# Patient Record
Sex: MALE | Race: BLACK OR AFRICAN AMERICAN | NOT HISPANIC OR LATINO | Employment: FULL TIME | URBAN - METROPOLITAN AREA
[De-identification: names, ages, dates, MRNs, and addresses within clinical notes are randomized per-mention and may not be internally consistent; named-entity substitution may affect disease eponyms.]

---

## 2023-01-18 ENCOUNTER — HOSPITAL ENCOUNTER (EMERGENCY)
Facility: HOSPITAL | Age: 33
Discharge: HOME | End: 2023-01-18
Attending: STUDENT IN AN ORGANIZED HEALTH CARE EDUCATION/TRAINING PROGRAM
Payer: MEDICAID

## 2023-01-18 VITALS
DIASTOLIC BLOOD PRESSURE: 84 MMHG | HEIGHT: 73 IN | BODY MASS INDEX: 26.51 KG/M2 | TEMPERATURE: 99 F | RESPIRATION RATE: 20 BRPM | OXYGEN SATURATION: 95 % | HEART RATE: 112 BPM | WEIGHT: 200 LBS | SYSTOLIC BLOOD PRESSURE: 143 MMHG

## 2023-01-18 DIAGNOSIS — U07.1 COVID-19: Primary | ICD-10-CM

## 2023-01-18 LAB
FLUAV AG UPPER RESP QL IA.RAPID: NOT DETECTED
FLUBV AG UPPER RESP QL IA.RAPID: NOT DETECTED
SARS-COV-2 RNA RESP QL NAA+PROBE: DETECTED

## 2023-01-18 PROCEDURE — 99283 EMERGENCY DEPT VISIT LOW MDM: CPT

## 2023-01-18 PROCEDURE — 0240U COVID/FLU A&B PCR: CPT | Performed by: STUDENT IN AN ORGANIZED HEALTH CARE EDUCATION/TRAINING PROGRAM

## 2023-01-18 PROCEDURE — 25000003 PHARM REV CODE 250: Performed by: STUDENT IN AN ORGANIZED HEALTH CARE EDUCATION/TRAINING PROGRAM

## 2023-01-18 RX ORDER — IBUPROFEN 600 MG/1
600 TABLET ORAL
Status: COMPLETED | OUTPATIENT
Start: 2023-01-18 | End: 2023-01-18

## 2023-01-18 RX ORDER — IBUPROFEN 600 MG/1
600 TABLET ORAL
Status: DISCONTINUED | OUTPATIENT
Start: 2023-01-18 | End: 2023-01-18

## 2023-01-18 RX ADMIN — IBUPROFEN 600 MG: 600 TABLET ORAL at 06:01

## 2023-01-18 NOTE — ED PROVIDER NOTES
Encounter Date: 1/18/2023       History     Chief Complaint   Patient presents with    Generalized Body Aches    Weakness    Headache     Patient c/o weakness, body aches, headache, and coughing that started a day ago. Patient reports he took tylenol 1 hour ago     Patient is a 32-year-old  male with a past medical history of type 1 diabetes who presented to the ER today due to cough, nonproductive, sinus congestion, myalgias.  Patient states this started 1 day prior to arrival.  Patient denies any sick contacts.  Patient has taken a Tylenol just prior to arrival.  Patient denies any chest pain, shortness of breath, abdominal pain, ataxia, altered mental status.    Review of patient's allergies indicates:  No Known Allergies  No past medical history on file.  No past surgical history on file.  No family history on file.     Review of Systems   Constitutional:  Positive for chills and fever. Negative for fatigue.   HENT:  Positive for congestion. Negative for sore throat and trouble swallowing.    Eyes:  Negative for pain and visual disturbance.   Respiratory:  Positive for cough. Negative for shortness of breath and wheezing.    Cardiovascular:  Negative for chest pain and palpitations.   Gastrointestinal:  Negative for abdominal pain, blood in stool, constipation, diarrhea, nausea and vomiting.   Genitourinary:  Negative for dysuria and hematuria.   Musculoskeletal:  Positive for myalgias. Negative for back pain.   Skin:  Negative for rash and wound.   Neurological:  Negative for seizures, syncope and headaches.   Psychiatric/Behavioral:  Negative for confusion. The patient is not nervous/anxious.      Physical Exam     Initial Vitals [01/18/23 0559]   BP Pulse Resp Temp SpO2   (!) 143/84 (!) 112 20 (!) 101.8 °F (38.8 °C) 95 %      MAP       --         Physical Exam    Nursing note and vitals reviewed.  Constitutional: He appears well-developed and well-nourished. No distress.   HENT:   Head:  Normocephalic and atraumatic.   Eyes: Conjunctivae and EOM are normal. Right eye exhibits no discharge. Left eye exhibits no discharge. No scleral icterus.   Neck: No tracheal deviation present.   Normal range of motion.  Cardiovascular:  Normal rate, regular rhythm and normal heart sounds.     Exam reveals no gallop and no friction rub.       No murmur heard.  Pulmonary/Chest: Breath sounds normal. No respiratory distress. He has no wheezes. He has no rhonchi. He has no rales.   Musculoskeletal:         General: Normal range of motion.      Cervical back: Normal range of motion.     Neurological: He is alert. He has normal strength.   Skin: Skin is warm and dry. No rash and no abscess noted. No erythema. No pallor.   Psychiatric: His behavior is normal. Judgment normal.       ED Course   Procedures  Labs Reviewed   COVID/FLU A&B PCR - Abnormal; Notable for the following components:       Result Value    SARS-CoV-2 PCR Detected (*)     All other components within normal limits    Narrative:     The Xpert Xpress SARS-CoV-2/FLU/RSV plus is a rapid, multiplexed real-time PCR test intended for the simultaneous qualitative detection and differentiation of SARS-CoV-2, Influenza A, Influenza B, and respiratory syncytial virus (RSV) viral RNA in either nasopharyngeal swab or nasal swab specimens.                Imaging Results    None          Medications   ibuprofen tablet 600 mg (600 mg Oral Given 1/18/23 0626)     Medical Decision Making:   Initial Assessment:   Overall well-appearing 32-year-old male appears to be in no acute distress  Differential Diagnosis:   COVID, flu, viral URI, viral illness  ED Management:  Temp 101.8° on arrival   Just took Tylenol prior to arrival   Motrin 600 given   Patient overall appears well   Symptoms most consistent with a viral pattern   COVID test positive   Flu test negative   I do attribute his symptoms being likely secondary to COVID and did discuss this with him   Five day work  excuse provided to patient and 5 days with face mask wearing afterwards   Maintain adequate hydration was advised  CDC guidelines were provided  Return precautions discussed and follow up with PCP is recommended                        Clinical Impression:   Final diagnoses:  [U07.1] COVID-19 (Primary)        ED Disposition Condition    Discharge Stable          ED Prescriptions    None       Follow-up Information       Follow up With Specialties Details Why Contact Info    [facility] - Emergency Dept Emergency Medicine  If symptoms worsen [address] [facility] [id number] [phone]             Christian Guido MD  01/18/23 0717

## 2023-01-18 NOTE — Clinical Note
"Rusty"Gunnar Burger was seen and treated in our emergency department on 1/18/2023.     COVID-19 is present in our communities across the state. There is limited testing for COVID at this time, so not all patients can be tested. In this situation, your employee meets the following criteria:    Rusty Burger has met the criteria for COVID-19 testing and has a POSITIVE result. He can return to work once they are asymptomatic for 24 hours without the use of fever reducing medications AND at least five days from the first positive result. A mask is recommended for 5 days post quarantine.     If you have any questions or concerns, or if I can be of further assistance, please do not hesitate to contact me.    Sincerely,             LEYLA Honeycutt RN"

## 2023-01-18 NOTE — Clinical Note
"Rusty"Gunnar Burger was seen and treated in our emergency department on 1/18/2023.  He may return to work on 01/24/2023.       If you have any questions or concerns, please don't hesitate to call.      Christian Guido MD"

## 2023-03-02 ENCOUNTER — TELEPHONE (OUTPATIENT)
Dept: OPHTHALMOLOGY | Facility: CLINIC | Age: 33
End: 2023-03-02
Payer: MEDICAID

## 2023-03-02 NOTE — TELEPHONE ENCOUNTER
----- Message from Enedina Diane sent at 3/2/2023 11:43 AM CST -----  Regarding: Referral Inquiry  Pt called to f/u on referral sent by [facility], pt does not know which doctor it was sent for. Pt states its been sent 3 times already.     Requesting Call [phone] (home)

## 2023-03-10 ENCOUNTER — OFFICE VISIT (OUTPATIENT)
Dept: OPHTHALMOLOGY | Facility: CLINIC | Age: 33
End: 2023-03-10
Payer: COMMERCIAL

## 2023-03-10 ENCOUNTER — OFFICE VISIT (OUTPATIENT)
Dept: OPHTHALMOLOGY | Facility: CLINIC | Age: 33
End: 2023-03-10
Payer: MEDICAID

## 2023-03-10 DIAGNOSIS — E10.3542: Primary | ICD-10-CM

## 2023-03-10 DIAGNOSIS — E10.3591 PROLIFERATIVE DIABETIC RETINOPATHY OF RIGHT EYE WITHOUT MACULAR EDEMA ASSOCIATED WITH TYPE 1 DIABETES MELLITUS: ICD-10-CM

## 2023-03-10 PROCEDURE — 92250 FUNDUS PHOTOGRAPHY W/I&R: CPT | Mod: PBBFAC | Performed by: OPHTHALMOLOGY

## 2023-03-10 PROCEDURE — 99999 PR PBB SHADOW E&M-EST. PATIENT-LVL I: ICD-10-PCS | Mod: PBBFAC,,, | Performed by: OPHTHALMOLOGY

## 2023-03-10 PROCEDURE — 1159F PR MEDICATION LIST DOCUMENTED IN MEDICAL RECORD: ICD-10-PCS | Mod: CPTII,,, | Performed by: OPTOMETRIST

## 2023-03-10 PROCEDURE — 99999 PR PBB SHADOW E&M-EST. PATIENT-LVL II: CPT | Mod: PBBFAC,,, | Performed by: OPTOMETRIST

## 2023-03-10 PROCEDURE — 99212 OFFICE O/P EST SF 10 MIN: CPT | Mod: PBBFAC,PO | Performed by: OPTOMETRIST

## 2023-03-10 PROCEDURE — 99499 UNLISTED E&M SERVICE: CPT | Mod: S$PBB,,, | Performed by: OPTOMETRIST

## 2023-03-10 PROCEDURE — 1159F MED LIST DOCD IN RCRD: CPT | Mod: CPTII,,, | Performed by: OPTOMETRIST

## 2023-03-10 PROCEDURE — 92134 POSTERIOR SEGMENT OCT RETINA (OCULAR COHERENCE TOMOGRAPHY)-BOTH EYES: ICD-10-PCS | Mod: S$GLB,,, | Performed by: OPHTHALMOLOGY

## 2023-03-10 PROCEDURE — 99204 OFFICE O/P NEW MOD 45 MIN: CPT | Mod: S$GLB,,, | Performed by: OPHTHALMOLOGY

## 2023-03-10 PROCEDURE — 99999 PR PBB SHADOW E&M-EST. PATIENT-LVL II: ICD-10-PCS | Mod: PBBFAC,,, | Performed by: OPTOMETRIST

## 2023-03-10 PROCEDURE — 92134 CPTRZ OPH DX IMG PST SGM RTA: CPT | Mod: PBBFAC | Performed by: OPHTHALMOLOGY

## 2023-03-10 PROCEDURE — 99999 PR PBB SHADOW E&M-EST. PATIENT-LVL I: CPT | Mod: PBBFAC,,, | Performed by: OPHTHALMOLOGY

## 2023-03-10 PROCEDURE — 99204 PR OFFICE/OUTPT VISIT, NEW, LEVL IV, 45-59 MIN: ICD-10-PCS | Mod: S$GLB,,, | Performed by: OPHTHALMOLOGY

## 2023-03-10 PROCEDURE — 99499 NO LOS: ICD-10-PCS | Mod: S$PBB,,, | Performed by: OPTOMETRIST

## 2023-03-10 PROCEDURE — 99211 OFF/OP EST MAY X REQ PHY/QHP: CPT | Mod: PBBFAC,27 | Performed by: OPHTHALMOLOGY

## 2023-03-10 RX ORDER — PEN NEEDLE, DIABETIC 31 GX5/16"
NEEDLE, DISPOSABLE MISCELLANEOUS
COMMUNITY
Start: 2022-12-22

## 2023-03-10 RX ORDER — MELOXICAM 7.5 MG/1
7.5 TABLET ORAL
COMMUNITY
Start: 2021-10-04

## 2023-03-10 RX ORDER — DOXYCYCLINE 100 MG/1
100 CAPSULE ORAL 2 TIMES DAILY
COMMUNITY
Start: 2022-10-03

## 2023-03-10 RX ORDER — GABAPENTIN 300 MG/1
300 CAPSULE ORAL 2 TIMES DAILY
COMMUNITY
Start: 2022-12-22

## 2023-03-10 RX ORDER — INSULIN GLARGINE 100 [IU]/ML
INJECTION, SOLUTION SUBCUTANEOUS
COMMUNITY
Start: 2023-02-16

## 2023-03-10 RX ORDER — LOSARTAN POTASSIUM 50 MG/1
50 TABLET ORAL
COMMUNITY
Start: 2022-12-22

## 2023-03-10 RX ORDER — INSULIN LISPRO 100 [IU]/ML
INJECTION, SOLUTION INTRAVENOUS; SUBCUTANEOUS
COMMUNITY
Start: 2023-02-16

## 2023-03-10 RX ORDER — ATORVASTATIN CALCIUM 40 MG/1
40 TABLET, FILM COATED ORAL
COMMUNITY
Start: 2022-12-22

## 2023-03-10 RX ORDER — PANTOPRAZOLE SODIUM 40 MG/1
40 TABLET, DELAYED RELEASE ORAL
COMMUNITY
Start: 2021-08-29

## 2023-03-10 NOTE — PROGRESS NOTES
===============================  Date today is 3/10/2023  Rusty Burger is a 32 y.o. male  Last visit [facility]: :Visit date not found   Last visit eye dept. Visit date not found    Uncorrected distance visual acuity was 20/20 in the right eye and CF in the left eye.  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  Check OU independently daily.    Gave my office and personal cell phone number.  ________________  3/10/2023 today  Rusty Burger    DM with PDR OU  Diabetic traction retinal detachment        Clear lens  No NVI  Optos shows NVD OU  OD 2+ DBH, ischemia  OS annular FVP, 4+ NVE, boxcarring  OS poor vision x2-3 months  Discussed tx options with patient to attempt to restore some vision OS  Likely sx will noticeably improve vision OS  OD will need PRP to prevent declining vision  Will have Dr. Burkett evaluate patient for OS surgery    RTC with Dr. Burkett  Instructed to call 24/7 for any worsening of vision or symptoms. Check OU daily.   Gave my office and cell phone number.      =============================

## 2023-03-10 NOTE — PROGRESS NOTES
HPI     Eye Problem            Comments: Blurry VA OS           Comments    Patient states about two months ago he started to have blurry VA OS.   Patient states last A1C was 10.6. Patient denies pain and discomfort.   Patient states he was referred from an outside clinic. Patient has no   further ocular complaints at this time.              Last edited by Davina Silva MA on 3/10/2023  8:05 AM.            Assessment /Plan     For exam results, see Encounter Report.    Left eye affected by proliferative diabetic retinopathy with combined traction and rhegmatogenous retinal detachment, associated with type 1 diabetes mellitus    Proliferative diabetic retinopathy of right eye without macular edema associated with type 1 diabetes mellitus    Patient referred from outside OD from [place] for retinal specialist, mistakenly was scheduled today in OD clinic. Patient was scheduled with Dr PEDRO today for evaluation at 10:30. RTC with Dr PEDRO for retinal consult.

## 2023-03-23 ENCOUNTER — TELEPHONE (OUTPATIENT)
Dept: OPHTHALMOLOGY | Facility: CLINIC | Age: 33
End: 2023-03-23

## 2023-03-23 ENCOUNTER — OFFICE VISIT (OUTPATIENT)
Dept: OPHTHALMOLOGY | Facility: CLINIC | Age: 33
End: 2023-03-23
Payer: MEDICAID

## 2023-03-23 DIAGNOSIS — E10.3591 TYPE 1 DIABETES MELLITUS WITH PROLIFERATIVE RETINOPATHY OF RIGHT EYE WITHOUT MACULAR EDEMA: ICD-10-CM

## 2023-03-23 DIAGNOSIS — E10.3522: Primary | ICD-10-CM

## 2023-03-23 DIAGNOSIS — H33.42 TRACTION DETACHMENT OF LEFT RETINA: Primary | ICD-10-CM

## 2023-03-23 DIAGNOSIS — H33.42 TRACTION RETINAL DETACHMENT, LEFT: ICD-10-CM

## 2023-03-23 DIAGNOSIS — H35.342 FULL THICKNESS MACULAR HOLE, LEFT: ICD-10-CM

## 2023-03-23 PROCEDURE — 4010F PR ACE/ARB THEARPY RXD/TAKEN: ICD-10-PCS | Mod: CPTII,,, | Performed by: OPHTHALMOLOGY

## 2023-03-23 PROCEDURE — 1160F PR REVIEW ALL MEDS BY PRESCRIBER/CLIN PHARMACIST DOCUMENTED: ICD-10-PCS | Mod: CPTII,,, | Performed by: OPHTHALMOLOGY

## 2023-03-23 PROCEDURE — 92014 COMPRE OPH EXAM EST PT 1/>: CPT | Mod: 25,S$PBB,, | Performed by: OPHTHALMOLOGY

## 2023-03-23 PROCEDURE — 92134 CPTRZ OPH DX IMG PST SGM RTA: CPT | Mod: PBBFAC | Performed by: OPHTHALMOLOGY

## 2023-03-23 PROCEDURE — 1159F MED LIST DOCD IN RCRD: CPT | Mod: CPTII,,, | Performed by: OPHTHALMOLOGY

## 2023-03-23 PROCEDURE — 92014 PR EYE EXAM, EST PATIENT,COMPREHESV: ICD-10-PCS | Mod: 25,S$PBB,, | Performed by: OPHTHALMOLOGY

## 2023-03-23 PROCEDURE — 1160F RVW MEDS BY RX/DR IN RCRD: CPT | Mod: CPTII,,, | Performed by: OPHTHALMOLOGY

## 2023-03-23 PROCEDURE — 99213 OFFICE O/P EST LOW 20 MIN: CPT | Mod: PBBFAC | Performed by: OPHTHALMOLOGY

## 2023-03-23 PROCEDURE — 99999 PR PBB SHADOW E&M-EST. PATIENT-LVL III: ICD-10-PCS | Mod: PBBFAC,,, | Performed by: OPHTHALMOLOGY

## 2023-03-23 PROCEDURE — 99999 PR PBB SHADOW E&M-EST. PATIENT-LVL III: CPT | Mod: PBBFAC,,, | Performed by: OPHTHALMOLOGY

## 2023-03-23 PROCEDURE — 1159F PR MEDICATION LIST DOCUMENTED IN MEDICAL RECORD: ICD-10-PCS | Mod: CPTII,,, | Performed by: OPHTHALMOLOGY

## 2023-03-23 PROCEDURE — 92134 POSTERIOR SEGMENT OCT RETINA (OCULAR COHERENCE TOMOGRAPHY)-BOTH EYES: ICD-10-PCS | Mod: 26,S$PBB,, | Performed by: OPHTHALMOLOGY

## 2023-03-23 PROCEDURE — 4010F ACE/ARB THERAPY RXD/TAKEN: CPT | Mod: CPTII,,, | Performed by: OPHTHALMOLOGY

## 2023-03-23 RX ADMIN — Medication 1.25 MG: at 03:03

## 2023-03-23 NOTE — PROGRESS NOTES
HPI    Pt here today for OCT/DFE and TRD per Dr. Bang. Pt denies f/f. Pt denies pain/discomfort OU. Pt states that he feels as though VA OS has declined since last visit.   Last edited by Jocelyn Boone on 3/23/2023  1:12 PM.          OCT - OD macular NV with some VMA  OS - poor view, but prior ERM with FTMH OS      A/P    DM  PDR OU  Uncontrolled - l;ast A1C 10.6  T1 dx at 17 year old  Does not recall last eye exam    Had worsening Va over last 2 months and sig increase in floaters over last 2-3 weeks OS    Will need PRP OD with Dr. Bang      2. TRD with FTMH OS    Plan 25g PPV/ILM peel with flap/membrane stripping/EL/C3F8/Avastin OS for TRD with FTMH OS    LMA  LOC 90 min    No heavy lifting for 3-4 weeks    Avastin OS today    Risks, benefits, and alternatives to treatment discussed in detail with the patient.  The patient voiced understanding and wished to proceed with the procedure    3. HTN Ret OU  BS/BP/chol control  
Ischemic stroke

## 2023-04-18 ENCOUNTER — TELEPHONE (OUTPATIENT)
Dept: OPHTHALMOLOGY | Facility: CLINIC | Age: 33
End: 2023-04-18
Payer: MEDICAID

## 2023-04-18 NOTE — TELEPHONE ENCOUNTER
----- Message from Tunde Arriaza sent at 4/18/2023  4:20 PM CDT -----  Contact: [phone]  Pt is calling to find out his arrival time and directions for his SX tomorrow. Please call back to further assist.

## 2023-04-19 ENCOUNTER — HOSPITAL ENCOUNTER (OUTPATIENT)
Facility: HOSPITAL | Age: 33
Discharge: HOME | End: 2023-04-19
Attending: OPHTHALMOLOGY | Admitting: OPHTHALMOLOGY
Payer: MEDICAID

## 2023-04-19 ENCOUNTER — ANESTHESIA (OUTPATIENT)
Dept: SURGERY | Facility: HOSPITAL | Age: 33
End: 2023-04-19
Payer: MEDICAID

## 2023-04-19 ENCOUNTER — ANESTHESIA EVENT (OUTPATIENT)
Dept: SURGERY | Facility: HOSPITAL | Age: 33
End: 2023-04-19
Payer: MEDICAID

## 2023-04-19 VITALS
WEIGHT: 200 LBS | SYSTOLIC BLOOD PRESSURE: 122 MMHG | OXYGEN SATURATION: 99 % | TEMPERATURE: 98 F | BODY MASS INDEX: 26.51 KG/M2 | DIASTOLIC BLOOD PRESSURE: 68 MMHG | HEART RATE: 73 BPM | RESPIRATION RATE: 11 BRPM | HEIGHT: 73 IN

## 2023-04-19 DIAGNOSIS — H33.42 TRACTION RETINAL DETACHMENT, LEFT: ICD-10-CM

## 2023-04-19 DIAGNOSIS — E10.3522: Primary | ICD-10-CM

## 2023-04-19 DIAGNOSIS — H35.342 FULL THICKNESS MACULAR HOLE, LEFT: ICD-10-CM

## 2023-04-19 DIAGNOSIS — H33.40: ICD-10-CM

## 2023-04-19 LAB
POCT GLUCOSE: 156 MG/DL (ref 70–110)
POCT GLUCOSE: 213 MG/DL (ref 70–110)

## 2023-04-19 PROCEDURE — 25000003 PHARM REV CODE 250: Performed by: NURSE ANESTHETIST, CERTIFIED REGISTERED

## 2023-04-19 PROCEDURE — D9220A PRA ANESTHESIA: ICD-10-PCS | Mod: ANES,,, | Performed by: ANESTHESIOLOGY

## 2023-04-19 PROCEDURE — 63600175 PHARM REV CODE 636 W HCPCS: Performed by: OPHTHALMOLOGY

## 2023-04-19 PROCEDURE — 99499 UNLISTED E&M SERVICE: CPT | Mod: ,,, | Performed by: STUDENT IN AN ORGANIZED HEALTH CARE EDUCATION/TRAINING PROGRAM

## 2023-04-19 PROCEDURE — 67113 REPAIR RETINAL DETACH CPLX: CPT | Mod: LT,,, | Performed by: OPHTHALMOLOGY

## 2023-04-19 PROCEDURE — 71000015 HC POSTOP RECOV 1ST HR: Performed by: OPHTHALMOLOGY

## 2023-04-19 PROCEDURE — D9220A PRA ANESTHESIA: ICD-10-PCS | Mod: CRNA,,, | Performed by: NURSE ANESTHETIST, CERTIFIED REGISTERED

## 2023-04-19 PROCEDURE — 67113 PR REPAIR COMPLEX RETINA DETACH VITRECTOMY & MEMB PEEL: ICD-10-PCS | Mod: LT,,, | Performed by: OPHTHALMOLOGY

## 2023-04-19 PROCEDURE — 99499 NO LOS: ICD-10-PCS | Mod: ,,, | Performed by: STUDENT IN AN ORGANIZED HEALTH CARE EDUCATION/TRAINING PROGRAM

## 2023-04-19 PROCEDURE — 82962 GLUCOSE BLOOD TEST: CPT | Performed by: OPHTHALMOLOGY

## 2023-04-19 PROCEDURE — 71000044 HC DOSC ROUTINE RECOVERY FIRST HOUR: Performed by: OPHTHALMOLOGY

## 2023-04-19 PROCEDURE — 27201423 OPTIME MED/SURG SUP & DEVICES STERILE SUPPLY: Performed by: OPHTHALMOLOGY

## 2023-04-19 PROCEDURE — 37000009 HC ANESTHESIA EA ADD 15 MINS: Performed by: OPHTHALMOLOGY

## 2023-04-19 PROCEDURE — 25000003 PHARM REV CODE 250: Performed by: OPHTHALMOLOGY

## 2023-04-19 PROCEDURE — 63600175 PHARM REV CODE 636 W HCPCS: Performed by: NURSE ANESTHETIST, CERTIFIED REGISTERED

## 2023-04-19 PROCEDURE — D9220A PRA ANESTHESIA: Mod: CRNA,,, | Performed by: NURSE ANESTHETIST, CERTIFIED REGISTERED

## 2023-04-19 PROCEDURE — 00145 ANES PX EYE VITREORTNL SURG: CPT | Performed by: OPHTHALMOLOGY

## 2023-04-19 PROCEDURE — 36000706: Performed by: OPHTHALMOLOGY

## 2023-04-19 PROCEDURE — 37000008 HC ANESTHESIA 1ST 15 MINUTES: Performed by: OPHTHALMOLOGY

## 2023-04-19 PROCEDURE — C1784 OCULAR DEV, INTRAOP, DET RET: HCPCS | Performed by: OPHTHALMOLOGY

## 2023-04-19 PROCEDURE — 36000707: Performed by: OPHTHALMOLOGY

## 2023-04-19 PROCEDURE — 25000003 PHARM REV CODE 250

## 2023-04-19 PROCEDURE — D9220A PRA ANESTHESIA: Mod: ANES,,, | Performed by: ANESTHESIOLOGY

## 2023-04-19 RX ORDER — NEOMYCIN SULFATE, POLYMYXIN B SULFATE, AND DEXAMETHASONE 3.5; 10000; 1 MG/G; [USP'U]/G; MG/G
OINTMENT OPHTHALMIC
Status: DISCONTINUED | OUTPATIENT
Start: 2023-04-19 | End: 2023-04-19 | Stop reason: HOSPADM

## 2023-04-19 RX ORDER — DEXAMETHASONE SODIUM PHOSPHATE 4 MG/ML
INJECTION, SOLUTION INTRA-ARTICULAR; INTRALESIONAL; INTRAMUSCULAR; INTRAVENOUS; SOFT TISSUE
Status: DISCONTINUED | OUTPATIENT
Start: 2023-04-19 | End: 2023-04-19 | Stop reason: HOSPADM

## 2023-04-19 RX ORDER — ATROPINE SULFATE 10 MG/ML
1 SOLUTION/ DROPS OPHTHALMIC
Status: DISCONTINUED | OUTPATIENT
Start: 2023-04-19 | End: 2023-04-19 | Stop reason: HOSPADM

## 2023-04-19 RX ORDER — MOXIFLOXACIN 5 MG/ML
1 SOLUTION/ DROPS OPHTHALMIC
Status: DISCONTINUED | OUTPATIENT
Start: 2023-04-19 | End: 2023-04-19 | Stop reason: HOSPADM

## 2023-04-19 RX ORDER — DEXAMETHASONE SODIUM PHOSPHATE 4 MG/ML
INJECTION, SOLUTION INTRA-ARTICULAR; INTRALESIONAL; INTRAMUSCULAR; INTRAVENOUS; SOFT TISSUE
Status: DISCONTINUED
Start: 2023-04-19 | End: 2023-04-19 | Stop reason: HOSPADM

## 2023-04-19 RX ORDER — OXYCODONE AND ACETAMINOPHEN 5; 325 MG/1; MG/1
1 TABLET ORAL EVERY 6 HOURS PRN
Qty: 12 TABLET | Refills: 0 | Status: SHIPPED | OUTPATIENT
Start: 2023-04-19

## 2023-04-19 RX ORDER — TETRACAINE HYDROCHLORIDE 5 MG/ML
1 SOLUTION OPHTHALMIC
Status: DISCONTINUED | OUTPATIENT
Start: 2023-04-19 | End: 2023-04-19 | Stop reason: HOSPADM

## 2023-04-19 RX ORDER — SODIUM CHLORIDE 0.9 % (FLUSH) 0.9 %
10 SYRINGE (ML) INJECTION
Status: DISCONTINUED | OUTPATIENT
Start: 2023-04-19 | End: 2023-04-19 | Stop reason: HOSPADM

## 2023-04-19 RX ORDER — HYDROCODONE BITARTRATE AND ACETAMINOPHEN 5; 325 MG/1; MG/1
1 TABLET ORAL EVERY 4 HOURS PRN
Status: DISCONTINUED | OUTPATIENT
Start: 2023-04-19 | End: 2023-04-19 | Stop reason: HOSPADM

## 2023-04-19 RX ORDER — ONDANSETRON 2 MG/ML
4 INJECTION INTRAMUSCULAR; INTRAVENOUS DAILY PRN
Status: DISCONTINUED | OUTPATIENT
Start: 2023-04-19 | End: 2023-04-19 | Stop reason: HOSPADM

## 2023-04-19 RX ORDER — ACETAMINOPHEN 325 MG/1
650 TABLET ORAL EVERY 4 HOURS PRN
Status: DISCONTINUED | OUTPATIENT
Start: 2023-04-19 | End: 2023-04-19 | Stop reason: HOSPADM

## 2023-04-19 RX ORDER — DEXMEDETOMIDINE HYDROCHLORIDE 100 UG/ML
INJECTION, SOLUTION INTRAVENOUS
Status: DISCONTINUED | OUTPATIENT
Start: 2023-04-19 | End: 2023-04-19

## 2023-04-19 RX ORDER — ONDANSETRON 2 MG/ML
INJECTION INTRAMUSCULAR; INTRAVENOUS
Status: DISCONTINUED | OUTPATIENT
Start: 2023-04-19 | End: 2023-04-19

## 2023-04-19 RX ORDER — AMLODIPINE BESYLATE 5 MG/1
5 TABLET ORAL DAILY
COMMUNITY

## 2023-04-19 RX ORDER — LIDOCAINE HYDROCHLORIDE 10 MG/ML
INJECTION, SOLUTION INTRAVENOUS
Status: DISCONTINUED | OUTPATIENT
Start: 2023-04-19 | End: 2023-04-19

## 2023-04-19 RX ORDER — NEOMYCIN SULFATE, POLYMYXIN B SULFATE, AND DEXAMETHASONE 3.5; 10000; 1 MG/G; [USP'U]/G; MG/G
OINTMENT OPHTHALMIC
Status: DISCONTINUED
Start: 2023-04-19 | End: 2023-04-19 | Stop reason: HOSPADM

## 2023-04-19 RX ORDER — LIDOCAINE HYDROCHLORIDE 20 MG/ML
INJECTION, SOLUTION EPIDURAL; INFILTRATION; INTRACAUDAL; PERINEURAL
Status: DISCONTINUED | OUTPATIENT
Start: 2023-04-19 | End: 2023-04-19 | Stop reason: HOSPADM

## 2023-04-19 RX ORDER — ONDANSETRON 4 MG/1
4 TABLET, FILM COATED ORAL EVERY 8 HOURS PRN
Qty: 12 TABLET | Refills: 0 | Status: SHIPPED | OUTPATIENT
Start: 2023-04-19

## 2023-04-19 RX ORDER — LIDOCAINE HYDROCHLORIDE 20 MG/ML
INJECTION, SOLUTION EPIDURAL; INFILTRATION; INTRACAUDAL; PERINEURAL
Status: DISCONTINUED
Start: 2023-04-19 | End: 2023-04-19 | Stop reason: HOSPADM

## 2023-04-19 RX ORDER — PROPOFOL 10 MG/ML
VIAL (ML) INTRAVENOUS
Status: DISCONTINUED | OUTPATIENT
Start: 2023-04-19 | End: 2023-04-19

## 2023-04-19 RX ORDER — CYCLOPENTOLATE HYDROCHLORIDE 10 MG/ML
1 SOLUTION/ DROPS OPHTHALMIC
Status: DISCONTINUED | OUTPATIENT
Start: 2023-04-19 | End: 2023-04-19 | Stop reason: HOSPADM

## 2023-04-19 RX ORDER — PREDNISOLONE ACETATE 10 MG/ML
1 SUSPENSION/ DROPS OPHTHALMIC
Status: DISCONTINUED | OUTPATIENT
Start: 2023-04-19 | End: 2023-04-19 | Stop reason: HOSPADM

## 2023-04-19 RX ORDER — EPINEPHRINE 1 MG/ML
INJECTION, SOLUTION, CONCENTRATE INTRAVENOUS
Status: DISCONTINUED | OUTPATIENT
Start: 2023-04-19 | End: 2023-04-19 | Stop reason: HOSPADM

## 2023-04-19 RX ORDER — FENTANYL CITRATE 50 UG/ML
25 INJECTION, SOLUTION INTRAMUSCULAR; INTRAVENOUS EVERY 5 MIN PRN
Status: DISCONTINUED | OUTPATIENT
Start: 2023-04-19 | End: 2023-04-19 | Stop reason: HOSPADM

## 2023-04-19 RX ORDER — MIDAZOLAM HYDROCHLORIDE 1 MG/ML
INJECTION, SOLUTION INTRAMUSCULAR; INTRAVENOUS
Status: DISCONTINUED | OUTPATIENT
Start: 2023-04-19 | End: 2023-04-19

## 2023-04-19 RX ORDER — FENTANYL CITRATE 50 UG/ML
INJECTION, SOLUTION INTRAMUSCULAR; INTRAVENOUS
Status: DISCONTINUED | OUTPATIENT
Start: 2023-04-19 | End: 2023-04-19

## 2023-04-19 RX ORDER — PHENYLEPHRINE HYDROCHLORIDE 25 MG/ML
1 SOLUTION/ DROPS OPHTHALMIC
Status: DISCONTINUED | OUTPATIENT
Start: 2023-04-19 | End: 2023-04-19 | Stop reason: HOSPADM

## 2023-04-19 RX ORDER — BUPIVACAINE HYDROCHLORIDE 7.5 MG/ML
INJECTION, SOLUTION EPIDURAL; RETROBULBAR
Status: DISCONTINUED | OUTPATIENT
Start: 2023-04-19 | End: 2023-04-19 | Stop reason: HOSPADM

## 2023-04-19 RX ADMIN — PREDNISOLONE ACETATE 1 DROP: 10 SUSPENSION/ DROPS OPHTHALMIC at 09:04

## 2023-04-19 RX ADMIN — MIDAZOLAM HYDROCHLORIDE 2 MG: 1 INJECTION, SOLUTION INTRAMUSCULAR; INTRAVENOUS at 11:04

## 2023-04-19 RX ADMIN — LIDOCAINE HYDROCHLORIDE 50 MG: 10 INJECTION, SOLUTION INTRAVENOUS at 11:04

## 2023-04-19 RX ADMIN — CYCLOPENTOLATE HYDROCHLORIDE 1 DROP: 10 SOLUTION/ DROPS OPHTHALMIC at 10:04

## 2023-04-19 RX ADMIN — TETRACAINE HYDROCHLORIDE 1 DROP: 5 SOLUTION OPHTHALMIC at 09:04

## 2023-04-19 RX ADMIN — FENTANYL CITRATE 25 MCG: 50 INJECTION, SOLUTION INTRAMUSCULAR; INTRAVENOUS at 11:04

## 2023-04-19 RX ADMIN — PHENYLEPHRINE HYDROCHLORIDE 1 DROP: 25 SOLUTION/ DROPS OPHTHALMIC at 10:04

## 2023-04-19 RX ADMIN — FENTANYL CITRATE 50 MCG: 50 INJECTION, SOLUTION INTRAMUSCULAR; INTRAVENOUS at 11:04

## 2023-04-19 RX ADMIN — ATROPINE SULFATE 1 DROP: 10 SOLUTION OPHTHALMIC at 10:04

## 2023-04-19 RX ADMIN — PREDNISOLONE ACETATE 1 DROP: 10 SUSPENSION/ DROPS OPHTHALMIC at 10:04

## 2023-04-19 RX ADMIN — ATROPINE SULFATE 1 DROP: 10 SOLUTION OPHTHALMIC at 09:04

## 2023-04-19 RX ADMIN — DEXMEDETOMIDINE HYDROCHLORIDE 8 MCG: 100 INJECTION, SOLUTION INTRAVENOUS at 12:04

## 2023-04-19 RX ADMIN — PROPOFOL 200 MG: 10 INJECTION, EMULSION INTRAVENOUS at 11:04

## 2023-04-19 RX ADMIN — MOXIFLOXACIN OPHTHALMIC 1 DROP: 5 SOLUTION/ DROPS OPHTHALMIC at 09:04

## 2023-04-19 RX ADMIN — CYCLOPENTOLATE HYDROCHLORIDE 1 DROP: 10 SOLUTION/ DROPS OPHTHALMIC at 09:04

## 2023-04-19 RX ADMIN — SODIUM CHLORIDE: 9 INJECTION, SOLUTION INTRAVENOUS at 11:04

## 2023-04-19 RX ADMIN — ONDANSETRON 4 MG: 2 INJECTION INTRAMUSCULAR; INTRAVENOUS at 11:04

## 2023-04-19 RX ADMIN — MOXIFLOXACIN OPHTHALMIC 1 DROP: 5 SOLUTION/ DROPS OPHTHALMIC at 10:04

## 2023-04-19 RX ADMIN — PHENYLEPHRINE HYDROCHLORIDE 1 DROP: 25 SOLUTION/ DROPS OPHTHALMIC at 09:04

## 2023-04-19 NOTE — ANESTHESIA PREPROCEDURE EVALUATION
"                                                                                                             2023  Rusty Burger is a 32 y.o., male.  Pre-operative evaluation for Procedure(s) (LRB):  VITRECTOMY, PARS PLANA APPROACH (Left)    Rusty Burger is a 32 y.o. male     Patient Active Problem List   Diagnosis    Traction retinal detachment, left    Type 1 diabetes mellitus with proliferative retinopathy of right eye without macular edema    Type 1 diabetes mellitus with left eye affected by proliferative retinopathy and traction retinal detachment involving macula    Full thickness macular hole, left       Review of patient's allergies indicates:  No Known Allergies    No current facility-administered medications on file prior to encounter.     Current Outpatient Medications on File Prior to Encounter   Medication Sig Dispense Refill    amLODIPine (NORVASC) 5 MG tablet Take 5 mg by mouth once daily. In evening      gabapentin (NEURONTIN) 300 MG capsule Take 300 mg by mouth 2 (two) times daily.      HUMALOG KWIKPEN INSULIN 100 unit/mL pen SMARTSIG:Unit(s) SUB-Q 3 Times Daily      LANTUS SOLOSTAR U-100 INSULIN glargine 100 units/mL SubQ pen SMARTSI Unit(s) SUB-Q Every Night      losartan (COZAAR) 50 MG tablet Take 50 mg by mouth.      atorvastatin (LIPITOR) 40 MG tablet Take 40 mg by mouth.      BD ULTRA-FINE SHORT PEN NEEDLE 31 gauge x 5/16" Ndle Inject into the skin.      doxycycline (VIBRAMYCIN) 100 MG Cap Take 100 mg by mouth 2 (two) times daily.      meloxicam (MOBIC) 7.5 MG tablet Take 7.5 mg by mouth.      pantoprazole (PROTONIX) 40 MG tablet Take 40 mg by mouth.         No past surgical history on file.    Social History     Socioeconomic History    Marital status: Single             Pre-op Assessment    I have reviewed the Patient Summary Reports.     I have reviewed the Nursing Notes.    I have reviewed the Medications.     Review of Systems  Anesthesia Hx:  No problems with " previous Anesthesia  History of prior surgery of interest to airway management or planning: Denies Family Hx of Anesthesia complications.   Denies Personal Hx of Anesthesia complications.   Social:  Non-Smoker    Hematology/Oncology:  Hematology Normal   Oncology Normal     EENT/Dental:EENT/Dental Normal   Cardiovascular:   Hypertension    Pulmonary:  Pulmonary Normal    Renal/:  Renal/ Normal     Hepatic/GI:  Hepatic/GI Normal    Musculoskeletal:  Musculoskeletal Normal    Neurological:  Neurology Normal    Endocrine:   Diabetes, poorly controlled    Psych:  Psychiatric Normal           Physical Exam  General: Well nourished and Cooperative    Airway:  Mallampati: I   Mouth Opening: Normal  TM Distance: Normal  Tongue: Normal  Neck ROM: Normal ROM    Dental:  Intact    Chest/Lungs:  Clear to auscultation, Normal Respiratory Rate    Heart:  Rate: Normal  Rhythm: Regular Rhythm  Sounds: Normal        Anesthesia Plan  Type of Anesthesia, risks & benefits discussed:    Anesthesia Type: Gen Supraglottic Airway  Intra-op Monitoring Plan: Standard ASA Monitors  Post Op Pain Control Plan: multimodal analgesia  Induction:  IV  Airway Plan: , Post-Induction  Informed Consent: Informed consent signed with the Patient and all parties understand the risks and agree with anesthesia plan.  All questions answered.   ASA Score: 3  Day of Surgery Review of History & Physical: H&P Update referred to the surgeon/provider.    Ready For Surgery From Anesthesia Perspective.     .

## 2023-04-19 NOTE — ANESTHESIA PROCEDURE NOTES
Intubation    Date/Time: 4/19/2023 11:23 AM  Performed by: Ai Buckley CRNA  Authorized by: Stephie Ramirez MD     Intubation:     Induction:  Intravenous    Intubated:  Postinduction    Mask Ventilation:  Easy mask    Attempts:  1    Attempted By:  CRNA    Difficult Airway Encountered?: No      Complications:  None    Airway Device:  Supraglottic airway/LMA    Airway Device Size:  4.5    Style/Cuff Inflation:  Cuffed (inflated to minimal occlusive pressure)    Secured at:  The lips    Placement Verified By:  Capnometry    Complicating Factors:  None    Findings Post-Intubation:  Atraumatic/condition of teeth unchanged

## 2023-04-19 NOTE — H&P
"Pre-Operative History & Physical  Ophthalmology      SUBJECTIVE:     History of Present Illness:  Patient is a 32 y.o. male presents with Traction detachment of left retina [H33.42]  Traction retinal detachment [H33.40].    MEDICATIONS:   PTA Medications   Medication Sig    amLODIPine (NORVASC) 5 MG tablet Take 5 mg by mouth once daily. In evening    gabapentin (NEURONTIN) 300 MG capsule Take 300 mg by mouth 2 (two) times daily.    HUMALOG KWIKPEN INSULIN 100 unit/mL pen SMARTSIG:Unit(s) SUB-Q 3 Times Daily    LANTUS SOLOSTAR U-100 INSULIN glargine 100 units/mL SubQ pen SMARTSI Unit(s) SUB-Q Every Night    losartan (COZAAR) 50 MG tablet Take 50 mg by mouth.    atorvastatin (LIPITOR) 40 MG tablet Take 40 mg by mouth.    BD ULTRA-FINE SHORT PEN NEEDLE 31 gauge x 5/16" Ndle Inject into the skin.    doxycycline (VIBRAMYCIN) 100 MG Cap Take 100 mg by mouth 2 (two) times daily.    meloxicam (MOBIC) 7.5 MG tablet Take 7.5 mg by mouth.    pantoprazole (PROTONIX) 40 MG tablet Take 40 mg by mouth.       ALLERGIES: Review of patient's allergies indicates:  No Known Allergies    PAST MEDICAL HISTORY: No past medical history on file.  PAST SURGICAL HISTORY: No past surgical history on file.  PAST FAMILY HISTORY: No family history on file.  SOCIAL HISTORY:       MENTAL STATUS: Alert    REVIEW OF SYSTEMS: Negative    OBJECTIVE:     Vital Signs (Most Recent)  Temp: 98.1 °F (36.7 °C) (23 1007)  Pulse: 75 (23 1007)  Resp: 16 (23 1007)  BP: (!) 143/94 (23 1007)  SpO2: 100 % (23 1007)    Physical Exam:  General: NAD  HEENT: AT/NC, left eye TRD (see last Ophthalmology clinic note for full eye exam)  Lungs: Adequate respirations  Heart: + pulses  Abdomen: Soft    ASSESSMENT/PLAN:     Patient is a 32 y.o. male with Traction detachment of left retina [H33.42]  Traction retinal detachment [H33.40].    - Plan for surgical correction with 25g PPV/ILM peel with flap/membrane stripping/EL/C3F8/Avastin OS for " TRD with FTMH OS.   - Allergies reviewed: Review of patient's allergies indicates:  No Known Allergies  - Risks/benefits/alternatives of the procedure including, but not limited to scarring, bleeding, infection, loss or decreased vision, and/or need for possible repeat surgery discussed with the patient and family.  - Informed consent obtained prior to surgery and the patient/family voiced good understanding.    Lama Roberto Carlos MD  [facility]

## 2023-04-19 NOTE — OP NOTE
Preoperative diagnosis: TRD with FTMH from PDR OS    Post op Dx: same    Procedure performed:  25g PPV/membrane stripping/ILM peel with flap/AFx/EL/C3F8 14% tamponade OS    Attending surgeons:  DOUGIE Burkett    Anesthesia:  LMA with retrobulbar injection 4.0cc mixture of 2% lidocaine, 0.75% marcaine    Estimated blood loss: minimal    Complications:  None    DOS: 4/19/23    Disposition: stable to recovery then home    Indications for surgery:   This is a 31 yo patient who noted worsening vision over the last few months in his left eye.  He has a diabetic tractional retinal detachment with full thickness macular hole.  Decision was made to take the patient to surgery to repair the TRD, prevent further vision loss and hopefully improved some vision.  Risks, benefits, and alternatives to surgery were discussed in detail. Risks including loss of vision, loss of eye, retina detachment, hemorrhage, infection, lens dislocation, glaucoma, hypotony, ptosis, diplopia    Description of procedure:  After proper informed consent was obtained, the patient was brought back to the operating room at [facility] where monitored anesthesia care was induced.  A retrobulbar injection was provided above without complication.  The patient is prepped draped in normal sterile fashion for ophthalmic surgery and a lid speculum was placed in the left eye.  A standard 3 port 25 gauge pars plana vitrectomy setup with the infusion cannula inserted 4.0 mm posterior to the limbus.  The infusion cannula was turned on after it was observed free and clear of all underlying tissue.  Super nasal and superotemporal trocars were also placed  4.0 mm posterior to the limbus.  The vitrector and light pipe were introduced in the vitreous cavity and a core vitrectomy was performed.   The perpheral hyaloid was  from the posterior tractional elements with the vitrector.  The ILM forceps and vitrector were used to strip away the  fibrotic attachments to the retina.   ICG was used to stain the internal limiting membrane which was peeled off with the ILM forceps under direct contact lens visualization.  A 360 degree cortical vitrectomy was performed. The retina became more free and mobile following these maneuvers.  The edges of the breaks were marked with diathermy. An air fluid exchange was performed.  The ILM flap was cented over the macular hole. The retina flattened nicely following this maneuver.  Endolaser was applied around the retina breaks and in a pan retinal photocoagulation fashion. The SN trocar was removed and not leaking after gentle massage. A C3F8 14% gas mixture was created.  Approximately 40 cc were cycled through the eye. The superotemporal and infusion trocars were removed and not leaking after gentle massage.  The eye was normal pressure via palpation.  Subconjunctival injections of vancomycin and Decadron were given and  the patient's the drapes removed. the patient was washed free of Betadine prep solution,  ointment was placed in the eye then patched shielded, mac anesthesia was reversed and he was brought to recovery in stable condition tolerating the procedure well.  Dr. Burkett was present for in entire case.

## 2023-04-19 NOTE — TRANSFER OF CARE
"Anesthesia Transfer of Care Note    Patient: Rusty Burger    Procedure(s) Performed: Procedure(s) (LRB):  VITRECTOMY, PARS PLANA APPROACH (Left)    Patient location: PACU    Anesthesia Type: general    Transport from OR: Transported from OR on 6-10 L/min O2 by face mask with adequate spontaneous ventilation    Post pain: adequate analgesia    Post assessment: no apparent anesthetic complications and tolerated procedure well    Post vital signs: stable    Level of consciousness: sedated    Nausea/Vomiting: no nausea/vomiting    Complications: none    Transfer of care protocol was followed      Last vitals:   Visit Vitals  BP (!) 106/59 (BP Location: Left arm, Patient Position: Lying)   Pulse 81   Temp 36.7 °C (98.1 °F) (Temporal)   Resp 14   Ht 6' 1" (1.854 m)   Wt 90.7 kg (200 lb)   SpO2 100%   BMI 26.39 kg/m²     "

## 2023-04-19 NOTE — BRIEF OP NOTE
Preoperative diagnosis: TRD with FTMH from PDR OS     Post op Dx: same     Procedure performed:  25g PPV/membrane stripping/ILM peel with flap/AFx/EL/C3F8 14% tamponade OS    Attending Surgeon: Kwadwo    Assistant Surgeon: Chava    Anesthesia: LMA, retrobulbar injection of 4.0cc mixture 0.75%Marcaine, 2% Xylocaine    Estimated blood loss: Minimal    Complication: None    Specimen: None    Disposition: Stable to recovery    Findings/Outcome: TRD with FTMH OS.      Date of Discharge: 4/19/23    Discharge Disposition: stable to recovery then home    F/U: tomorrow

## 2023-04-19 NOTE — DISCHARGE SUMMARY
[facility] (1st Fl)  Discharge Note  Short Stay    Procedure(s) (LRB):  VITRECTOMY, PARS PLANA APPROACH (Left)      OUTCOME: Patient tolerated treatment/procedure well without complication and is now ready for discharge.    DISPOSITION: Home or Self Care    FINAL DIAGNOSIS:  FTMH with TRD OS    FOLLOWUP: In clinic    DISCHARGE INSTRUCTIONS:  No discharge procedures on file.     TIME SPENT ON DISCHARGE:    minutes

## 2023-04-20 ENCOUNTER — OFFICE VISIT (OUTPATIENT)
Dept: OPHTHALMOLOGY | Facility: CLINIC | Age: 33
End: 2023-04-20
Payer: MEDICAID

## 2023-04-20 DIAGNOSIS — E10.3522: Primary | ICD-10-CM

## 2023-04-20 DIAGNOSIS — H33.42 TRACTION RETINAL DETACHMENT, LEFT: ICD-10-CM

## 2023-04-20 DIAGNOSIS — E10.3591 TYPE 1 DIABETES MELLITUS WITH PROLIFERATIVE RETINOPATHY OF RIGHT EYE WITHOUT MACULAR EDEMA: ICD-10-CM

## 2023-04-20 PROCEDURE — 99999 PR PBB SHADOW E&M-EST. PATIENT-LVL III: CPT | Mod: PBBFAC,,, | Performed by: OPHTHALMOLOGY

## 2023-04-20 PROCEDURE — 4010F PR ACE/ARB THEARPY RXD/TAKEN: ICD-10-PCS | Mod: CPTII,,, | Performed by: OPHTHALMOLOGY

## 2023-04-20 PROCEDURE — 4010F ACE/ARB THERAPY RXD/TAKEN: CPT | Mod: CPTII,,, | Performed by: OPHTHALMOLOGY

## 2023-04-20 PROCEDURE — 1159F PR MEDICATION LIST DOCUMENTED IN MEDICAL RECORD: ICD-10-PCS | Mod: CPTII,,, | Performed by: OPHTHALMOLOGY

## 2023-04-20 PROCEDURE — 1159F MED LIST DOCD IN RCRD: CPT | Mod: CPTII,,, | Performed by: OPHTHALMOLOGY

## 2023-04-20 PROCEDURE — 99024 POSTOP FOLLOW-UP VISIT: CPT | Mod: ,,, | Performed by: OPHTHALMOLOGY

## 2023-04-20 PROCEDURE — 99999 PR PBB SHADOW E&M-EST. PATIENT-LVL III: ICD-10-PCS | Mod: PBBFAC,,, | Performed by: OPHTHALMOLOGY

## 2023-04-20 PROCEDURE — 99024 PR POST-OP FOLLOW-UP VISIT: ICD-10-PCS | Mod: ,,, | Performed by: OPHTHALMOLOGY

## 2023-04-20 PROCEDURE — 99213 OFFICE O/P EST LOW 20 MIN: CPT | Mod: PBBFAC,PO | Performed by: OPHTHALMOLOGY

## 2023-04-20 RX ORDER — LOSARTAN POTASSIUM AND HYDROCHLOROTHIAZIDE 12.5; 1 MG/1; MG/1
TABLET ORAL
COMMUNITY
Start: 2023-04-11

## 2023-04-20 NOTE — ANESTHESIA POSTPROCEDURE EVALUATION
Anesthesia Post Evaluation    Patient: Rusty Burger    Procedure(s) Performed: Procedure(s) (LRB):  VITRECTOMY, PARS PLANA APPROACH (Left)    Final Anesthesia Type: general      Patient location during evaluation: PACU  Patient participation: Yes- Able to Participate  Level of consciousness: awake and alert and oriented  Post-procedure vital signs: reviewed and stable  Pain management: adequate  Airway patency: patent    PONV status at discharge: No PONV  Anesthetic complications: no      Cardiovascular status: blood pressure returned to baseline and hemodynamically stable  Respiratory status: unassisted, spontaneous ventilation and room air  Hydration status: euvolemic  Follow-up not needed.          Vitals Value Taken Time   /68 04/19/23 1400   Temp 36.7 °C (98 °F) 04/19/23 1303   Pulse 72 04/19/23 1404   Resp 11 04/19/23 1316   SpO2 100 % 04/19/23 1404   Vitals shown include unvalidated device data.      No case tracking events are documented in the log.      Pain/Sharri Score: Sharri Score: 10 (4/19/2023  2:00 PM)

## 2023-04-20 NOTE — PROGRESS NOTES
HPI     Post-op Evaluation     Additional comments: 1 day po           Comments    S/p 25g PPV/ILM peel with flap/membrane stripping/EL/C3F8/Avastin OS for   TRD with FTMH OS- 4/19/2023    Patient states the left eye is feeling well today. No pain. No discharge.   HPI    Pt here today for OCT/DFE and TRD per Dr. Bang. Pt denies f/f. Pt denies pain/discomfort OU. Pt states that he feels as though VA OS has declined since last visit.   Last edited by Jocelyn Boone on 3/23/2023  1:12 PM.          OCT - OD macular NV with some VMA  OS - poor view, but prior ERM with FTMH OS      A/P    DM  PDR OU  Uncontrolled - l;ast A1C 10.6  T1 dx at 17 year old  Does not recall last eye exam    Had worsening Va over last 2 months and sig increase in floaters over last 2-3 weeks OS    Will need PRP OD with Dr. Bang    S/p Avastin OS x 1 with me pre op      2. TRD with FTMH OS    S/p 25g PPV/ILM peel with flap/membrane stripping/EL/C3F8/Avastin OS for TRD with FTMH OS 4/19/23    Doing well, good IOP  Start gtts QID  Ointment/shield QHS    Face down x 3 days    3. HTN Ret OU  BS/BP/chol control      Dr. Bang 1 week

## 2023-04-27 ENCOUNTER — OFFICE VISIT (OUTPATIENT)
Dept: OPHTHALMOLOGY | Facility: CLINIC | Age: 33
End: 2023-04-27
Payer: MEDICAID

## 2023-04-27 DIAGNOSIS — Z98.890 POST-OPERATIVE STATE: Primary | ICD-10-CM

## 2023-04-27 PROCEDURE — 99999 PR PBB SHADOW E&M-EST. PATIENT-LVL III: ICD-10-PCS | Mod: PBBFAC,,, | Performed by: OPHTHALMOLOGY

## 2023-04-27 PROCEDURE — 4010F PR ACE/ARB THEARPY RXD/TAKEN: ICD-10-PCS | Mod: CPTII,,, | Performed by: OPHTHALMOLOGY

## 2023-04-27 PROCEDURE — 1159F PR MEDICATION LIST DOCUMENTED IN MEDICAL RECORD: ICD-10-PCS | Mod: CPTII,,, | Performed by: OPHTHALMOLOGY

## 2023-04-27 PROCEDURE — 1160F RVW MEDS BY RX/DR IN RCRD: CPT | Mod: CPTII,,, | Performed by: OPHTHALMOLOGY

## 2023-04-27 PROCEDURE — 99999 PR PBB SHADOW E&M-EST. PATIENT-LVL III: CPT | Mod: PBBFAC,,, | Performed by: OPHTHALMOLOGY

## 2023-04-27 PROCEDURE — 4010F ACE/ARB THERAPY RXD/TAKEN: CPT | Mod: CPTII,,, | Performed by: OPHTHALMOLOGY

## 2023-04-27 PROCEDURE — 99024 PR POST-OP FOLLOW-UP VISIT: ICD-10-PCS | Mod: ,,, | Performed by: OPHTHALMOLOGY

## 2023-04-27 PROCEDURE — 1160F PR REVIEW ALL MEDS BY PRESCRIBER/CLIN PHARMACIST DOCUMENTED: ICD-10-PCS | Mod: CPTII,,, | Performed by: OPHTHALMOLOGY

## 2023-04-27 PROCEDURE — 99024 POSTOP FOLLOW-UP VISIT: CPT | Mod: ,,, | Performed by: OPHTHALMOLOGY

## 2023-04-27 PROCEDURE — 1159F MED LIST DOCD IN RCRD: CPT | Mod: CPTII,,, | Performed by: OPHTHALMOLOGY

## 2023-04-27 PROCEDURE — 99213 OFFICE O/P EST LOW 20 MIN: CPT | Mod: PBBFAC | Performed by: OPHTHALMOLOGY

## 2023-04-27 NOTE — PROGRESS NOTES
SUBJECTIVE  Rusty Burger is 32 y.o. male  Uncorrected distance visual acuity was not recorded in the right eye and HM in the left eye.   Chief Complaint   Patient presents with    Post-op Evaluation     S/p PPV/ ILM peel with Dr Burkett. Using gtts as recommended. Vision remains blurred. Denies pain or irritation.           HPI     Post-op Evaluation     Additional comments: S/p PPV/ ILM peel with Dr Burkett. Using gtts as   recommended. Vision remains blurred. Denies pain or irritation.            Comments    1. Dm dx 2007  OS traction detachment  OU PDR  2. S/p 25g PPV/ILM peel with flap/membrane stripping/EL/C3F8  Avastin OS for   TRD with FT OS- 4/19/2023            Last edited by Adolfo Downey on 4/27/2023 11:01 AM.         Assessment /Plan :  1. Post-operative state - S/p 25g PPV/ILM peel with flap/membrane stripping/EL/C3F8 OS, Doing well    Discontinue Gatifloxacin  Continue Pred TID and Atropine TID OS    Return to clinic with Dr. Bang in 1-2 weeks

## 2023-05-08 ENCOUNTER — OFFICE VISIT (OUTPATIENT)
Dept: OPHTHALMOLOGY | Facility: CLINIC | Age: 33
End: 2023-05-08
Payer: MEDICAID

## 2023-05-08 DIAGNOSIS — Z98.890 POST-OPERATIVE STATE: Primary | ICD-10-CM

## 2023-05-08 PROCEDURE — 1159F MED LIST DOCD IN RCRD: CPT | Mod: CPTII,,, | Performed by: OPHTHALMOLOGY

## 2023-05-08 PROCEDURE — 1160F RVW MEDS BY RX/DR IN RCRD: CPT | Mod: CPTII,,, | Performed by: OPHTHALMOLOGY

## 2023-05-08 PROCEDURE — 1159F PR MEDICATION LIST DOCUMENTED IN MEDICAL RECORD: ICD-10-PCS | Mod: CPTII,,, | Performed by: OPHTHALMOLOGY

## 2023-05-08 PROCEDURE — 99999 PR PBB SHADOW E&M-EST. PATIENT-LVL III: ICD-10-PCS | Mod: PBBFAC,,, | Performed by: OPHTHALMOLOGY

## 2023-05-08 PROCEDURE — 4010F ACE/ARB THERAPY RXD/TAKEN: CPT | Mod: CPTII,,, | Performed by: OPHTHALMOLOGY

## 2023-05-08 PROCEDURE — 99024 POSTOP FOLLOW-UP VISIT: CPT | Mod: ,,, | Performed by: OPHTHALMOLOGY

## 2023-05-08 PROCEDURE — 99213 OFFICE O/P EST LOW 20 MIN: CPT | Mod: PBBFAC | Performed by: OPHTHALMOLOGY

## 2023-05-08 PROCEDURE — 4010F PR ACE/ARB THEARPY RXD/TAKEN: ICD-10-PCS | Mod: CPTII,,, | Performed by: OPHTHALMOLOGY

## 2023-05-08 PROCEDURE — 1160F PR REVIEW ALL MEDS BY PRESCRIBER/CLIN PHARMACIST DOCUMENTED: ICD-10-PCS | Mod: CPTII,,, | Performed by: OPHTHALMOLOGY

## 2023-05-08 PROCEDURE — 99999 PR PBB SHADOW E&M-EST. PATIENT-LVL III: CPT | Mod: PBBFAC,,, | Performed by: OPHTHALMOLOGY

## 2023-05-08 PROCEDURE — 99024 PR POST-OP FOLLOW-UP VISIT: ICD-10-PCS | Mod: ,,, | Performed by: OPHTHALMOLOGY

## 2023-05-08 NOTE — PROGRESS NOTES
SUBJECTIVE  Rusty Burger is 32 y.o. male  Uncorrected distance visual acuity was 20/20 in the right eye and HM in the left eye.   Chief Complaint   Patient presents with    Post-op Evaluation     Pt reports for 1-2wk post op PPV/ILM. Denies any pain or irritation. Va stable. 100% compliant with gtts.           HPI     Post-op Evaluation     Additional comments: Pt reports for 1-2wk post op PPV/ILM. Denies any   pain or irritation. Va stable. 100% compliant with gtts.            Comments    1. Dm dx 2007  OS traction detachment  OU PDR  2. 4/19/23--- S/p 25g PPV/ILM peel with flap/membrane stripping/EL/C3F8  Avastin OS for   TRD with FTMH OS- 4/19/2023      Pred/Atropine/Gati TID OS            Last edited by Jack Stokes on 5/8/2023  8:25 AM.         Assessment /Plan :  1. Post-operative state - 25g PPV/ILM peel with flap/membrane stripping/EL/C3F8 OS, doing well    Able to return to normal activities    Discontinue Gatifloxacin  Continue Pred BID and Atropine QID OS    Return to clinic with Dr. Bang in 3 weeks or PRN

## 2023-05-16 ENCOUNTER — TELEPHONE (OUTPATIENT)
Dept: OPHTHALMOLOGY | Facility: CLINIC | Age: 33
End: 2023-05-16
Payer: MEDICAID

## 2023-05-16 NOTE — TELEPHONE ENCOUNTER
----- Message from Nika Mcmahon sent at 5/16/2023 10:59 AM CDT -----  Contact: Rusty Watson is calling to speak with the nurse in regards to paperwork. Reports needing work release. Please give pt a call back at [phone]. Thanks JOSE D

## 2023-05-16 NOTE — TELEPHONE ENCOUNTER
Spoke with patient, he wants a letter stateing it is ok to return to work. He will call back with a fax # to send it to.

## 2023-06-02 ENCOUNTER — OFFICE VISIT (OUTPATIENT)
Dept: OPHTHALMOLOGY | Facility: CLINIC | Age: 33
End: 2023-06-02
Payer: MEDICAID

## 2023-06-02 DIAGNOSIS — H33.42 TRACTION RETINAL DETACHMENT, LEFT: ICD-10-CM

## 2023-06-02 DIAGNOSIS — Z98.890 POST-OPERATIVE STATE: Primary | ICD-10-CM

## 2023-06-02 PROCEDURE — 99024 PR POST-OP FOLLOW-UP VISIT: ICD-10-PCS | Mod: ,,, | Performed by: OPHTHALMOLOGY

## 2023-06-02 PROCEDURE — 99213 OFFICE O/P EST LOW 20 MIN: CPT | Mod: PBBFAC | Performed by: OPHTHALMOLOGY

## 2023-06-02 PROCEDURE — 99024 POSTOP FOLLOW-UP VISIT: CPT | Mod: ,,, | Performed by: OPHTHALMOLOGY

## 2023-06-02 PROCEDURE — 92134 CPTRZ OPH DX IMG PST SGM RTA: CPT | Mod: PBBFAC | Performed by: OPHTHALMOLOGY

## 2023-06-02 PROCEDURE — 1160F PR REVIEW ALL MEDS BY PRESCRIBER/CLIN PHARMACIST DOCUMENTED: ICD-10-PCS | Mod: CPTII,,, | Performed by: OPHTHALMOLOGY

## 2023-06-02 PROCEDURE — 1159F MED LIST DOCD IN RCRD: CPT | Mod: CPTII,,, | Performed by: OPHTHALMOLOGY

## 2023-06-02 PROCEDURE — 92134 POSTERIOR SEGMENT OCT RETINA (OCULAR COHERENCE TOMOGRAPHY)-BOTH EYES: ICD-10-PCS | Mod: 26,S$PBB,, | Performed by: OPHTHALMOLOGY

## 2023-06-02 PROCEDURE — 4010F ACE/ARB THERAPY RXD/TAKEN: CPT | Mod: CPTII,,, | Performed by: OPHTHALMOLOGY

## 2023-06-02 PROCEDURE — 1159F PR MEDICATION LIST DOCUMENTED IN MEDICAL RECORD: ICD-10-PCS | Mod: CPTII,,, | Performed by: OPHTHALMOLOGY

## 2023-06-02 PROCEDURE — 99999 PR PBB SHADOW E&M-EST. PATIENT-LVL III: CPT | Mod: PBBFAC,,, | Performed by: OPHTHALMOLOGY

## 2023-06-02 PROCEDURE — 4010F PR ACE/ARB THEARPY RXD/TAKEN: ICD-10-PCS | Mod: CPTII,,, | Performed by: OPHTHALMOLOGY

## 2023-06-02 PROCEDURE — 99999 PR PBB SHADOW E&M-EST. PATIENT-LVL III: ICD-10-PCS | Mod: PBBFAC,,, | Performed by: OPHTHALMOLOGY

## 2023-06-02 PROCEDURE — 1160F RVW MEDS BY RX/DR IN RCRD: CPT | Mod: CPTII,,, | Performed by: OPHTHALMOLOGY

## 2023-06-05 NOTE — PROGRESS NOTES
===============================  Date today is 6/2/2023  Rusty Burger is a 32 y.o. male  Last visit [facility]: :3/10/2023   Last visit eye dept. 5/8/2023    Uncorrected distance visual acuity was 20/25 in the right eye and 20/HM in the left eye.  Tonometry       Tonometry ([facility], 2:43 PM)         Right Left    Pressure +13.5 11.4                  Not recorded       Not recorded       Not recorded       Chief Complaint   Patient presents with    Follow-up     Patient here for PPV/ILV follow up.Patient states he guess things are getting better. Patient currently Pred and Atropine QID . Patient able to tolerate gtts. Patient denies pain and discomfort.       HPI     Follow-up     Additional comments: Patient here for PPV/ILV follow up.Patient states he   guess things are getting better. Patient currently Pred and Atropine QID .   Patient able to tolerate gtts. Patient denies pain and discomfort.             Comments    1. Dm dx 2007  OS traction detachment  OU PDR  2. 4/19/23--- S/p 25g PPV/ILM peel with flap/membrane stripping/EL/C3F8  Avastin OS for   TRD with FTMH OS- 4/19/2023      Pred BID OS  Atropine QID OS          Last edited by Jona Plaza on 6/2/2023  2:41 PM.      Problem List Items Addressed This Visit          Eye/Vision problems    Traction retinal detachment, left    Relevant Orders    Posterior Segment OCT Retina-Both eyes (Completed)     Other Visit Diagnoses       Post-operative state    -  Primary    Relevant Orders    Posterior Segment OCT Retina-Both eyes (Completed)          Instructed to call 24/7 for any worsening of vision, visual distortion or pain.  Check OU independently daily.    Gave my office and personal cell phone number.  ________________  6/2/2023 today  Rusty Burger      Post PPV OS 4/19/23  Clear view in  15% bubble  C/d 0.25  Doing well   OCT shows SRF OS- watch for now  Decrease Pred and Atropine to BID until out    RTC 2-3 weeks recheck SRF   Instructed to call 24/7 for  any worsening of vision or symptoms. Check OU daily.   Gave my office and cell phone number.    =============================

## 2023-06-23 ENCOUNTER — TELEPHONE (OUTPATIENT)
Dept: OPHTHALMOLOGY | Facility: CLINIC | Age: 33
End: 2023-06-23
Payer: MEDICAID

## 2023-06-23 NOTE — TELEPHONE ENCOUNTER
----- Message from Mary Pang sent at 6/23/2023 11:45 AM CDT -----  Contact: pt  pt is calling and will be late, approx arrival 2pm.  Please call him back at [phone] thanks/mpd

## 2023-07-13 ENCOUNTER — OFFICE VISIT (OUTPATIENT)
Dept: OPHTHALMOLOGY | Facility: CLINIC | Age: 33
End: 2023-07-13
Payer: MEDICAID

## 2023-07-13 DIAGNOSIS — Z98.890 POST-OPERATIVE STATE: Primary | ICD-10-CM

## 2023-07-13 DIAGNOSIS — H35.342 FULL THICKNESS MACULAR HOLE, LEFT: ICD-10-CM

## 2023-07-13 DIAGNOSIS — Z98.890 HISTORY OF VITRECTOMY: ICD-10-CM

## 2023-07-13 DIAGNOSIS — H33.42 TRACTION RETINAL DETACHMENT, LEFT: ICD-10-CM

## 2023-07-13 PROCEDURE — 1159F MED LIST DOCD IN RCRD: CPT | Mod: CPTII,,, | Performed by: OPHTHALMOLOGY

## 2023-07-13 PROCEDURE — 1160F RVW MEDS BY RX/DR IN RCRD: CPT | Mod: CPTII,,, | Performed by: OPHTHALMOLOGY

## 2023-07-13 PROCEDURE — 99999 PR PBB SHADOW E&M-EST. PATIENT-LVL III: ICD-10-PCS | Mod: PBBFAC,,, | Performed by: OPHTHALMOLOGY

## 2023-07-13 PROCEDURE — 92134 CPTRZ OPH DX IMG PST SGM RTA: CPT | Mod: PBBFAC | Performed by: OPHTHALMOLOGY

## 2023-07-13 PROCEDURE — 99024 PR POST-OP FOLLOW-UP VISIT: ICD-10-PCS | Mod: S$PBB,,, | Performed by: OPHTHALMOLOGY

## 2023-07-13 PROCEDURE — 1159F PR MEDICATION LIST DOCUMENTED IN MEDICAL RECORD: ICD-10-PCS | Mod: CPTII,,, | Performed by: OPHTHALMOLOGY

## 2023-07-13 PROCEDURE — 1160F PR REVIEW ALL MEDS BY PRESCRIBER/CLIN PHARMACIST DOCUMENTED: ICD-10-PCS | Mod: CPTII,,, | Performed by: OPHTHALMOLOGY

## 2023-07-13 PROCEDURE — 4010F PR ACE/ARB THEARPY RXD/TAKEN: ICD-10-PCS | Mod: CPTII,,, | Performed by: OPHTHALMOLOGY

## 2023-07-13 PROCEDURE — 92134 POSTERIOR SEGMENT OCT RETINA (OCULAR COHERENCE TOMOGRAPHY)-BOTH EYES: ICD-10-PCS | Mod: 26,S$PBB,, | Performed by: OPHTHALMOLOGY

## 2023-07-13 PROCEDURE — 4010F ACE/ARB THERAPY RXD/TAKEN: CPT | Mod: CPTII,,, | Performed by: OPHTHALMOLOGY

## 2023-07-13 PROCEDURE — 99213 OFFICE O/P EST LOW 20 MIN: CPT | Mod: PBBFAC | Performed by: OPHTHALMOLOGY

## 2023-07-13 PROCEDURE — 99999 PR PBB SHADOW E&M-EST. PATIENT-LVL III: CPT | Mod: PBBFAC,,, | Performed by: OPHTHALMOLOGY

## 2023-07-13 PROCEDURE — 99024 POSTOP FOLLOW-UP VISIT: CPT | Mod: S$PBB,,, | Performed by: OPHTHALMOLOGY

## 2023-07-13 NOTE — PROGRESS NOTES
===============================  Date today is 7/13/2023  Rusty Burger is a 32 y.o. male  Last visit [facility]: :6/2/2023   Last visit eye dept. 6/2/2023    Uncorrected distance visual acuity was 20/25 in the right eye and 20/400 in the left eye.  Not recorded       Not recorded       Not recorded       Not recorded       Chief Complaint   Patient presents with    Procedure     HPI    1. Dm dx 2007  OS traction detachment  OU PDR  2. 4/19/23--- S/p 25g PPV/ILM peel with flap/membrane stripping/EL/C3F8  Avastin OS for   TRD with FTMH OS- 4/19/2023      Pred BID OS  Atropine BID OS    Last edited by Adolfo Downey on 7/13/2023  1:21 PM.      Problem List Items Addressed This Visit          Eye/Vision problems    Traction retinal detachment, left    Full thickness macular hole, left     Other Visit Diagnoses       Post-operative state    -  Primary    History of vitrectomy              Instructed to call 24/7 for any worsening of vision, visual distortion or pain.  Check OU independently daily.    Gave my office and personal cell phone number.  ________________  7/13/2023 today  Rusty Burger    Post PPV OS for FTMH/TRD  4/19/23--- S/p 25g PPV/ILM peel with flap/membrane stripping/EL/C3F8  Avastin OS for   TRD with FTMH OS- 4/19/2023  OCT today better SRF OS    Will discuss with Dr. Burkett if he would like to evaluate the patient  Clear view in OS  Clear lens  Well perfused disc OS 0.3  Ischemic vessels OS  Ghost vessels OS  DBH OS  No dependent blood OS  Ok to resume normal activities    RTC 1 month  Instructed to call 24/7 for any worsening of vision or symptoms. Check OU daily.   Gave my office and cell phone number.      =============================

## 2023-08-17 NOTE — PROGRESS NOTES
===============================  Date today is 8/18/2023  Rusty Burger is a 32 y.o. male  Last visit [facility]: :7/13/2023   Last visit eye dept. 7/13/2023    Uncorrected distance visual acuity was 20/20 in the right eye and 20/100- in the left eye.  Tonometry       Tonometry ([facility], 2:42 PM)         Right Left    Pressure 16 17                  Not recorded       Not recorded       Not recorded       Chief Complaint   Patient presents with    Post-op Evaluation     1 month PO/  PPV OS       Problem List Items Addressed This Visit    None  Visit Diagnoses       Post-operative state    -  Primary    History of vitrectomy              Instructed to call 24/7 for any worsening of vision, visual distortion or pain.  Check OU independently daily.    Gave my office and personal cell phone number.  ________________  8/18/2023 today  Rusty Burger      Post PPV OS TRD/FTMH 4/19/23  Better SRF today  Preop va 20/400, now 20/100  Post op worsening of pre existing symptoms    RTC 2 months  Instructed to call 24/7 for any worsening of vision or symptoms. Check OU daily.   Gave my office and cell phone number.    =============================

## 2023-08-18 ENCOUNTER — OFFICE VISIT (OUTPATIENT)
Dept: OPHTHALMOLOGY | Facility: CLINIC | Age: 33
End: 2023-08-18
Payer: MEDICAID

## 2023-08-18 DIAGNOSIS — Z98.890 POST-OPERATIVE STATE: Primary | ICD-10-CM

## 2023-08-18 DIAGNOSIS — Z98.890 HISTORY OF VITRECTOMY: ICD-10-CM

## 2023-08-18 PROCEDURE — 99212 OFFICE O/P EST SF 10 MIN: CPT | Mod: PBBFAC | Performed by: OPHTHALMOLOGY

## 2023-08-18 PROCEDURE — 99024 POSTOP FOLLOW-UP VISIT: CPT | Mod: ,,, | Performed by: OPHTHALMOLOGY

## 2023-08-18 PROCEDURE — 99999 PR PBB SHADOW E&M-EST. PATIENT-LVL II: CPT | Mod: PBBFAC,,, | Performed by: OPHTHALMOLOGY

## 2023-08-18 PROCEDURE — 1159F MED LIST DOCD IN RCRD: CPT | Mod: CPTII,,, | Performed by: OPHTHALMOLOGY

## 2023-08-18 PROCEDURE — 99999 PR PBB SHADOW E&M-EST. PATIENT-LVL II: ICD-10-PCS | Mod: PBBFAC,,, | Performed by: OPHTHALMOLOGY

## 2023-08-18 PROCEDURE — 1159F PR MEDICATION LIST DOCUMENTED IN MEDICAL RECORD: ICD-10-PCS | Mod: CPTII,,, | Performed by: OPHTHALMOLOGY

## 2023-08-18 PROCEDURE — 4010F PR ACE/ARB THEARPY RXD/TAKEN: ICD-10-PCS | Mod: CPTII,,, | Performed by: OPHTHALMOLOGY

## 2023-08-18 PROCEDURE — 99024 PR POST-OP FOLLOW-UP VISIT: ICD-10-PCS | Mod: ,,, | Performed by: OPHTHALMOLOGY

## 2023-08-18 PROCEDURE — 4010F ACE/ARB THERAPY RXD/TAKEN: CPT | Mod: CPTII,,, | Performed by: OPHTHALMOLOGY

## 2023-08-18 PROCEDURE — 1160F RVW MEDS BY RX/DR IN RCRD: CPT | Mod: CPTII,,, | Performed by: OPHTHALMOLOGY

## 2023-08-18 PROCEDURE — 1160F PR REVIEW ALL MEDS BY PRESCRIBER/CLIN PHARMACIST DOCUMENTED: ICD-10-PCS | Mod: CPTII,,, | Performed by: OPHTHALMOLOGY

## 2023-09-29 ENCOUNTER — TELEPHONE (OUTPATIENT)
Dept: OPHTHALMOLOGY | Facility: CLINIC | Age: 33
End: 2023-09-29
Payer: MEDICAID

## 2023-09-29 NOTE — TELEPHONE ENCOUNTER
Called Mr. Burger back at 3:17 with no answer.  He does not have his voicemail set up yet to leave a message.  kf

## 2023-12-15 ENCOUNTER — OFFICE VISIT (OUTPATIENT)
Dept: OPHTHALMOLOGY | Facility: CLINIC | Age: 33
End: 2023-12-15
Payer: COMMERCIAL

## 2023-12-15 DIAGNOSIS — H35.342 FULL THICKNESS MACULAR HOLE, LEFT: ICD-10-CM

## 2023-12-15 DIAGNOSIS — E10.3522: ICD-10-CM

## 2023-12-15 DIAGNOSIS — Z98.890 POST-OPERATIVE STATE: Primary | ICD-10-CM

## 2023-12-15 DIAGNOSIS — H33.42 TRACTION DETACHMENT OF LEFT RETINA: ICD-10-CM

## 2023-12-15 DIAGNOSIS — Z98.890 HISTORY OF VITRECTOMY: ICD-10-CM

## 2023-12-15 DIAGNOSIS — E10.3542: ICD-10-CM

## 2023-12-15 DIAGNOSIS — H33.42 TRACTION RETINAL DETACHMENT, LEFT: ICD-10-CM

## 2023-12-15 PROCEDURE — 99024 POSTOP FOLLOW-UP VISIT: CPT | Mod: S$GLB,,, | Performed by: OPHTHALMOLOGY

## 2023-12-15 PROCEDURE — 99999 PR PBB SHADOW E&M-EST. PATIENT-LVL III: ICD-10-PCS | Mod: PBBFAC,,, | Performed by: OPHTHALMOLOGY

## 2023-12-15 PROCEDURE — 99213 OFFICE O/P EST LOW 20 MIN: CPT | Mod: PBBFAC | Performed by: OPHTHALMOLOGY

## 2023-12-15 PROCEDURE — 99999 PR PBB SHADOW E&M-EST. PATIENT-LVL III: CPT | Mod: PBBFAC,,, | Performed by: OPHTHALMOLOGY

## 2023-12-15 PROCEDURE — 92134 CPTRZ OPH DX IMG PST SGM RTA: CPT | Mod: PBBFAC | Performed by: OPHTHALMOLOGY

## 2023-12-15 PROCEDURE — 92134 OCT, RETINA - OU - BOTH EYES: ICD-10-PCS | Mod: S$GLB,,, | Performed by: OPHTHALMOLOGY

## 2023-12-15 PROCEDURE — 99024 PR POST-OP FOLLOW-UP VISIT: ICD-10-PCS | Mod: S$GLB,,, | Performed by: OPHTHALMOLOGY

## 2023-12-15 NOTE — PROGRESS NOTES
.cc        ===============================  Date today is 12/15/2023  Rusty Burger is a 33 y.o. male  Last visit [facility]: :8/18/2023   Last visit eye dept. 8/18/2023    Uncorrected distance visual acuity was 20/25 in the right eye and 20/200 in the left eye.  Tonometry       Tonometry ([facility], 9:44 AM)         Right Left    Pressure 17 18                  Not recorded       Not recorded       Not recorded       Chief Complaint   Patient presents with    Follow-up     2 month follow up.     HPI     Follow-up     Additional comments: 2 month follow up.           Comments    1. Dm dx 2007  OS traction detachment  OU PDR  2. 4/19/23--- S/p 25g PPV/ILM peel with flap/membrane stripping/EL/C3F8  Avastin OS for   TRD with FTMH OS- 4/19/2023             Last edited by Hellen Emerson on 12/15/2023  9:52 AM.      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  Check OU independently daily.    Gave my office and personal cell phone number.  ________________  12/15/2023 today  Rusty Burger      OD stable BDR   OS post PPV at   OCT SRF slightly better but persistent   20/200 OS   0.3  Ghost vessels OS   Normal perfusion   Consult Dr. Osuna     RTC 10 weeks   Instructed to call 24/7 for any worsening of vision or symptoms. Check OU daily.   Gave my office and cell phone number.    =============================

## 2024-01-02 ENCOUNTER — OFFICE VISIT (OUTPATIENT)
Dept: OPHTHALMOLOGY | Facility: CLINIC | Age: 34
End: 2024-01-02
Payer: MEDICAID

## 2024-01-02 DIAGNOSIS — H33.42 TRACTION RETINAL DETACHMENT, LEFT: Primary | ICD-10-CM

## 2024-01-02 DIAGNOSIS — E10.3522: ICD-10-CM

## 2024-01-02 PROCEDURE — 99999 PR PBB SHADOW E&M-EST. PATIENT-LVL III: CPT | Mod: PBBFAC,,, | Performed by: OPHTHALMOLOGY

## 2024-01-02 PROCEDURE — 92014 COMPRE OPH EXAM EST PT 1/>: CPT | Mod: S$PBB,,, | Performed by: OPHTHALMOLOGY

## 2024-01-02 PROCEDURE — 92134 CPTRZ OPH DX IMG PST SGM RTA: CPT | Mod: PBBFAC | Performed by: OPHTHALMOLOGY

## 2024-01-02 PROCEDURE — 99213 OFFICE O/P EST LOW 20 MIN: CPT | Mod: PBBFAC,25 | Performed by: OPHTHALMOLOGY

## 2024-01-02 RX ORDER — ACETAZOLAMIDE 500 MG/1
500 CAPSULE, EXTENDED RELEASE ORAL 2 TIMES DAILY
Qty: 60 CAPSULE | Refills: 11 | Status: SHIPPED | OUTPATIENT
Start: 2024-01-02 | End: 2025-01-01

## 2024-01-02 NOTE — PROGRESS NOTES
HPI     9 month follow up  DM  AND DFE      Additional comments: DFE   DM   LAST BS     350   LAST A1C    UNSURE         OCT - OD macular NV with some VMA  OS - FTMH closed post peel with ILM flap.  Persistent shallow macular SRF      A/P    DM  PDR OU  Uncontrolled - l;ast A1C 10.6  T1 dx at 17 year old  Does not recall last eye exam    Had worsening Va over last 2 months and sig increase in floaters over last 2-3 weeks OS    Will need PRP OD with Dr. Bang    S/p Avastin OS x 1 with me pre op      2. TRD with FTMH OS    S/p 25g PPV/ILM peel with flap/membrane stripping/EL/C3F8/Avastin OS for TRD with FTMH OS 4/19/23 1/24 - persistent shallow SRF inferiorly  - no breaks on OCT of area.  No role for additional laser.  Try course of Diamox to prime RPE pump.  If NI with pills, can consider PPV to resolve submacular fluid        3. HTN Ret OU  BS/BP/chol control      1 month OCT

## 2024-01-30 ENCOUNTER — OFFICE VISIT (OUTPATIENT)
Dept: OPHTHALMOLOGY | Facility: CLINIC | Age: 34
End: 2024-01-30
Payer: MEDICAID

## 2024-01-30 DIAGNOSIS — E10.3522: ICD-10-CM

## 2024-01-30 DIAGNOSIS — H35.342 FULL THICKNESS MACULAR HOLE, LEFT: ICD-10-CM

## 2024-01-30 DIAGNOSIS — H33.42 TRACTION RETINAL DETACHMENT, LEFT: Primary | ICD-10-CM

## 2024-01-30 PROCEDURE — 99213 OFFICE O/P EST LOW 20 MIN: CPT | Mod: PBBFAC | Performed by: OPHTHALMOLOGY

## 2024-01-30 PROCEDURE — 99999 PR PBB SHADOW E&M-EST. PATIENT-LVL III: CPT | Mod: PBBFAC,,, | Performed by: OPHTHALMOLOGY

## 2024-01-30 PROCEDURE — 92014 COMPRE OPH EXAM EST PT 1/>: CPT | Mod: S$PBB,,, | Performed by: OPHTHALMOLOGY

## 2024-01-30 NOTE — PROGRESS NOTES
HPI     4  WEEK FOLLOW   UP   DFE      Additional comments: 4  WEEK FOLLOW UP   DFE  DM   LAST BS    unsure  LAST   A1C    unsure       BLURRED  VA     TRD  with FTMH  OS            Comments    DLS    01/02/2024  OS traction detachment  OU PDR  2. 4/19/23--- S/p 25g PPV/ILM peel with flap/membrane stripping/EL/C3F8  Avastin OS for   TRD with FTMH OS- 4/19/2023       HPI     9 month follow up  DM  AND DFE      Additional comments: DFE   DM   LAST BS     350   LAST A1C    UNSURE         OCT - OD macular NV with some VMA  OS - FTMH closed post peel with ILM flap.  Persistent shallow macular SRF      A/P    DM  PDR OU  Uncontrolled - l;ast A1C 10.6  T1 dx at 17 year old  Does not recall last eye exam    Had worsening Va over last 2 months and sig increase in floaters over last 2-3 weeks OS    Will need PRP OD with Dr. Bang    S/p Avastin OS x 1 with me pre op      2. TRD with FTMH OS    S/p 25g PPV/ILM peel with flap/membrane stripping/EL/C3F8/Avastin OS for TRD with FTMH OS 4/19/23 1/24 - persistent shallow SRF inferiorly  - no breaks on OCT of area.  No role for additional laser.  Try course of Diamox to prime RPE pump.  If NI with pills, can consider PPV to resolve submacular fluid    1/30/24 -  NI with diamox - ok to DC    Given TRD with FTMH pre op - overall patient doing much better.  Would not recommend additional PPV with posterior retinotomy to remove persistent fluid.          3. HTN Ret OU  BS/BP/chol control      3 month OCT and dialte  
No

## 2024-02-26 ENCOUNTER — OFFICE VISIT (OUTPATIENT)
Dept: OPHTHALMOLOGY | Facility: CLINIC | Age: 34
End: 2024-02-26
Payer: MEDICAID

## 2024-02-26 DIAGNOSIS — E10.3522: ICD-10-CM

## 2024-02-26 DIAGNOSIS — E10.3591 TYPE 1 DIABETES MELLITUS WITH PROLIFERATIVE RETINOPATHY OF RIGHT EYE WITHOUT MACULAR EDEMA: Primary | ICD-10-CM

## 2024-02-26 DIAGNOSIS — Z98.890 HISTORY OF VITRECTOMY: ICD-10-CM

## 2024-02-26 PROCEDURE — 1160F RVW MEDS BY RX/DR IN RCRD: CPT | Mod: CPTII,,, | Performed by: OPHTHALMOLOGY

## 2024-02-26 PROCEDURE — 99212 OFFICE O/P EST SF 10 MIN: CPT | Mod: PBBFAC | Performed by: OPHTHALMOLOGY

## 2024-02-26 PROCEDURE — 99214 OFFICE O/P EST MOD 30 MIN: CPT | Mod: 25,S$PBB,, | Performed by: OPHTHALMOLOGY

## 2024-02-26 PROCEDURE — 92134 CPTRZ OPH DX IMG PST SGM RTA: CPT | Mod: PBBFAC | Performed by: OPHTHALMOLOGY

## 2024-02-26 PROCEDURE — 99999 PR PBB SHADOW E&M-EST. PATIENT-LVL II: CPT | Mod: PBBFAC,,, | Performed by: OPHTHALMOLOGY

## 2024-02-26 PROCEDURE — 2022F DILAT RTA XM EVC RTNOPTHY: CPT | Mod: CPTII,,, | Performed by: OPHTHALMOLOGY

## 2024-02-26 PROCEDURE — 1159F MED LIST DOCD IN RCRD: CPT | Mod: CPTII,,, | Performed by: OPHTHALMOLOGY

## 2024-02-26 PROCEDURE — 67228 TREATMENT X10SV RETINOPATHY: CPT | Mod: PBBFAC,RT | Performed by: OPHTHALMOLOGY

## 2024-02-26 NOTE — PROGRESS NOTES
===============================  Date today is 2/26/2024  Rusty Burger is a 33 y.o. male  Last visit [facility]: :12/15/2023   Last visit eye dept. 12/15/2023    Uncorrected distance visual acuity was 20/25- in the right eye and 20/200 in the left eye.  Tonometry       Tonometry ([facility], 10:36 AM)         Right Left    Pressure 15 15                  Not recorded       Not recorded       Not recorded       Chief Complaint   Patient presents with    Post-op Evaluation     PRP OD     HPI     Post-op Evaluation     Additional comments: PRP OD           Comments    1. Dm dx 2007  OS traction detachment  OU PDR  2. 4/19/23--- S/p 25g PPV/ILM peel with flap/membrane stripping/EL/C3F8  Avastin OS for   TRD with FTMH OS- 4/19/2023             Last edited by Talita Nieves on 2/26/2024 10:33 AM.      Problem List Items Addressed This Visit          Eye/Vision problems    Type 1 diabetes mellitus with proliferative retinopathy of right eye without macular edema - Primary    Relevant Orders    Posterior Segment OCT Retina-Both eyes (Completed)    Pan Retinal Photocoagulation - OD - Right Eye (Completed)    Type 1 diabetes mellitus with left eye affected by proliferative retinopathy and traction retinal detachment involving macula     Other Visit Diagnoses       History of vitrectomy              Instructed to call 24/7 for any worsening of vision, visual distortion or pain.  Check OU independently daily.    Gave my office and personal cell phone number.  ________________  2/26/2024 today  Rusty Burger    :TRD with FTMH OS     S/p 25g PPV/ILM peel with flap/membrane stripping/EL/C3F8/Avastin OS for TRD with FTMH OS 4/19/23 1/24 - persistent shallow SRF inferiorly  - no breaks on OCT of area.  No role for additional laser.  Try course of Diamox to prime RPE pump.  If NI with pills, can consider PPV to resolve submacular fluid     1/30/24 -  NI with diamox - ok to DC     Given TRD with FTMH pre op - overall  patient doing much better.  Would not recommend additional PPV with posterior retinotomy to remove persistent fluid.          No further surgery os  -  s/p ppv trd                                                             Proc Note:   Laser PRP to OD   Dx: PDR OD  I have explained the Risks, Benefits and Alternatives, of the procedure in detail.  The patient voices understanding and all questions have been answered.  The patient agrees to proceed as planned.   Topical Anesthesia with Proparacaine  Laser: Argon  Power: 300  Duration:30  Interval:350  Number: [phone]  Pt tolerated procedure well.  No complications were observed.    RTC rtc 3-4 weeks for added PRP OD    Hai Bang MD.          =============================

## 2024-05-20 ENCOUNTER — OFFICE VISIT (OUTPATIENT)
Dept: OPHTHALMOLOGY | Facility: CLINIC | Age: 34
End: 2024-05-20

## 2024-05-20 DIAGNOSIS — Z98.890 HISTORY OF VITRECTOMY: ICD-10-CM

## 2024-05-20 DIAGNOSIS — E10.3522: ICD-10-CM

## 2024-05-20 DIAGNOSIS — H33.41 RETINAL DETACHMENT, TRACTIONAL, RIGHT: ICD-10-CM

## 2024-05-20 DIAGNOSIS — E10.3591 TYPE 1 DIABETES MELLITUS WITH PROLIFERATIVE RETINOPATHY OF RIGHT EYE WITHOUT MACULAR EDEMA: Primary | ICD-10-CM

## 2024-05-20 PROCEDURE — 99999 PR PBB SHADOW E&M-EST. PATIENT-LVL III: CPT | Mod: PBBFAC,,, | Performed by: OPHTHALMOLOGY

## 2024-05-20 PROCEDURE — 99499 UNLISTED E&M SERVICE: CPT | Mod: S$PBB,,, | Performed by: OPHTHALMOLOGY

## 2024-05-20 PROCEDURE — 99213 OFFICE O/P EST LOW 20 MIN: CPT | Mod: PBBFAC,25 | Performed by: OPHTHALMOLOGY

## 2024-05-20 PROCEDURE — 92134 CPTRZ OPH DX IMG PST SGM RTA: CPT | Mod: PBBFAC | Performed by: OPHTHALMOLOGY

## 2024-05-20 NOTE — PROGRESS NOTES
===============================  Date today is 5/20/2024  Rusty Burger is a 33 y.o. male  Last visit [facility]: :2/26/2024   Last visit eye dept. 2/26/2024    Uncorrected distance visual acuity was 20/50 in the right eye and 20/200 in the left eye.  Tonometry       Tonometry ([facility], 2:40 PM)         Right Left    Pressure 16 16                  Not recorded       Not recorded       Not recorded       Chief Complaint   Patient presents with    PDR     PRP OD     HPI     PDR     Additional comments: PRP OD           Comments    1. Dm dx 2007  OS traction detachment  OU PDR  2. 4/19/23--- S/p 25g PPV/ILM peel with flap/membrane stripping/EL/C3F8  Avastin OS for   TRD with FTMH OS- 4/19/2023   PRP OD 2/26/24            Last edited by Brie Brandt on 5/20/2024  2:22 PM.      Problem List Items Addressed This Visit          Eye/Vision problems    Type 1 diabetes mellitus with proliferative retinopathy of right eye without macular edema - Primary    Relevant Orders    Posterior Segment OCT Retina-Both eyes (Completed)    Type 1 diabetes mellitus with left eye affected by proliferative retinopathy and traction retinal detachment involving macula     Other Visit Diagnoses       History of vitrectomy        Retinal detachment, tractional, right        Relevant Orders    Posterior Segment OCT Retina-Both eyes (Completed)          Instructed to call 24/7 for any worsening of vision, visual distortion or pain.  Check OU independently daily.    Gave my office and personal cell phone number.  ________________  5/20/2024 today  Rusty Burger      OD worse FVP with macula traction OD  OCT worsened OD- new TRD    No laser today  Will have patient see Dr. Burkett to determine if pt need PRP vs. PPV vs. Avgf    RTC with Dr. Burkett   Instructed to call 24/7 for any worsening of vision or symptoms. Check OU daily.   Gave my office and cell phone number.    =============================

## 2024-05-24 ENCOUNTER — TELEPHONE (OUTPATIENT)
Dept: OPHTHALMOLOGY | Facility: CLINIC | Age: 34
End: 2024-05-24

## 2024-05-27 ENCOUNTER — OFFICE VISIT (OUTPATIENT)
Dept: OPHTHALMOLOGY | Facility: CLINIC | Age: 34
End: 2024-05-27

## 2024-05-27 ENCOUNTER — TELEPHONE (OUTPATIENT)
Dept: OPHTHALMOLOGY | Facility: CLINIC | Age: 34
End: 2024-05-27

## 2024-05-27 DIAGNOSIS — E10.3521 TYPE 1 DIABETES MELLITUS WITH RIGHT EYE AFFECTED BY PROLIFERATIVE RETINOPATHY AND TRACTION RETINAL DETACHMENT INVOLVING MACULA: ICD-10-CM

## 2024-05-27 DIAGNOSIS — E10.3591 TYPE 1 DIABETES MELLITUS WITH PROLIFERATIVE RETINOPATHY OF RIGHT EYE WITHOUT MACULAR EDEMA: Primary | ICD-10-CM

## 2024-05-27 DIAGNOSIS — H33.41 RETINAL DETACHMENT, TRACTIONAL, RIGHT: Primary | ICD-10-CM

## 2024-05-27 DIAGNOSIS — H43.821 VITREOMACULAR ADHESION, RIGHT: ICD-10-CM

## 2024-05-27 PROCEDURE — 92014 COMPRE OPH EXAM EST PT 1/>: CPT | Mod: S$PBB,,, | Performed by: OPHTHALMOLOGY

## 2024-05-27 PROCEDURE — 92134 CPTRZ OPH DX IMG PST SGM RTA: CPT | Mod: PBBFAC | Performed by: OPHTHALMOLOGY

## 2024-05-27 PROCEDURE — 99999 PR PBB SHADOW E&M-EST. PATIENT-LVL III: CPT | Mod: PBBFAC,,, | Performed by: OPHTHALMOLOGY

## 2024-05-27 PROCEDURE — 92201 OPSCPY EXTND RTA DRAW UNI/BI: CPT | Mod: S$PBB,,, | Performed by: OPHTHALMOLOGY

## 2024-05-27 PROCEDURE — 92201 OPSCPY EXTND RTA DRAW UNI/BI: CPT | Mod: PBBFAC | Performed by: OPHTHALMOLOGY

## 2024-05-27 PROCEDURE — 99213 OFFICE O/P EST LOW 20 MIN: CPT | Mod: PBBFAC | Performed by: OPHTHALMOLOGY

## 2024-05-27 NOTE — PROGRESS NOTES
HPI     new patient to  retina    etinal detachment  os      Additional comments: DM   RETINAL DETACHMENT OS   REFERRED BY DR ENCARNACION     DIABETIC RETINOPATHY   LAST BS      307  LAST A1C      10   last yr           Comments    1. Dm dx 2007  OS traction detachment  OU PDR  2. 4/19/23--- S/p 25g PPV/ILM peel with flap/membrane stripping/EL/C3F8  Avastin OS for   TRD with FTMH OS- 4/19/2023   PRP OD 2/26/24            Last edited by Sarah Keating on 5/27/2024  2:08 PM.              OCT - OD worsening VMT with superior traction  OS - FTMH closed post peel with ILM flap.  SRF improving nicely      A/P    DM  PDR OU  Uncontrolled - l;ast A1C 10.6  T1 dx at 17 year old  Does not recall last eye exam    Had worsening Va over last 2 months and sig increase in floaters over last 2-3 weeks OS    S/p Avastin OS x 1 with me pre op      2. TRD with FTMH OS    S/p 25g PPV/ILM peel with flap/membrane stripping/EL/C3F8/Avastin OS for TRD with FTMH OS 4/19/23 1/24 - persistent shallow SRF inferiorly  - no breaks on OCT of area.  No role for additional laser.  Try course of Diamox to prime RPE pump.  If NI with pills, can consider PPV to resolve submacular fluid    1/30/24 -  NI with diamox - ok to DC  5/24 - OS SRF improving    OD - superior traction with VMT component - Pt noticed worsening over last 3-4 weeks.    Plan 25g PPV/membrane stripping/ILM peel/partial AFx/Avastin  OD for TRD/VMT OD    LMA  LOC 60 min  Isabella case    Risks, benefits, and alternatives to treatment discussed in detail with the patient.  The patient voiced understanding and wished to proceed with the procedure        3. HTN Ret OU  BS/BP/chol control      To OR

## 2024-06-10 NOTE — PRE-PROCEDURE INSTRUCTIONS
PreOp Instructions given:   - Verbal medication information (what to hold and what to take)   - NPO guidelines 2400  - Arrival place directions given; time to be given the day before procedure by the   Surgeon's Office [facility]  - Bathing with antibacterial soap   - Don't wear any jewelry or bring any valuables AM of surgery   - No makeup or moisturizer to face   - No perfume/cologne, powder, lotions or aftershave   Pt. verbalized understanding.   Pt denies any h/o Anesthesia/Sedation complications or side effects.  Patient does not know arrival time.  Explained that this information comes from the surgeon's office and if they haven't heard from them by 2 or 3 pm to call the office.  Patient stated an understanding.

## 2024-06-11 ENCOUNTER — TELEPHONE (OUTPATIENT)
Dept: OPHTHALMOLOGY | Facility: CLINIC | Age: 34
End: 2024-06-11
Payer: COMMERCIAL

## 2024-06-11 NOTE — H&P
Pre-Operative History & Physical  Ophthalmology      SUBJECTIVE:     History of Present Illness:  Patient is a 33 y.o. male presents with Retinal detachment, tractional, right [H33.41].    MEDICATIONS:   No medications prior to admission.       ALLERGIES: Review of patient's allergies indicates:  No Known Allergies    PAST MEDICAL HISTORY: No past medical history on file.  PAST SURGICAL HISTORY:   Past Surgical History:   Procedure Laterality Date    VITRECTOMY BY PARS PLANA APPROACH Left 4/19/2023    Procedure: VITRECTOMY, PARS PLANA APPROACH;  Surgeon: DOUGIE Burkett MD;  Location: [facility] OR [address] [place] [address];  Service: Ophthalmology;  Laterality: Left;  LMA  90 min     PAST FAMILY HISTORY: No family history on file.  SOCIAL HISTORY:   Social History     Tobacco Use    Smoking status: Unknown        MENTAL STATUS: Alert    REVIEW OF SYSTEMS: Negative    OBJECTIVE:     Vital Signs (Most Recent)       Physical Exam:  General: NAD  HEENT: AT/NC, TRD/VMT OD  Lungs: Adequate respirations  Heart: + pulses  Abdomen: Soft    ASSESSMENT/PLAN:     Patient is a 33 y.o. male with Retinal detachment, tractional, right [H33.41]      - Risks/benefits/alternatives of the procedure including, but not limited to, infection, bleeding, pain, ptosis, macular edema, corneal edema, persistent corneal defect, retinal tears, retinal detachment, epiretinal membrane, elevated intraocular pressure, hypotony, cataract formation, possible need for strict post-op head positioning, possible temporary avoidance of air travel, loss of vision, loss of the eye, paralysis, and death were discussed with the patient and/or family. The patient/family voiced good understanding, the informed consent was signed, witnessed, and placed in chart. All patient and family questions were answered.   - Will proceed with 25G PPV/membrane stripping/ILM peel/partial AFx/Avastin OD  - Plan for general anesthesia   - Allergies reviewed: Review of patient's allergies  indicates:  No Known Allergies      Supplies Needed  Retrobulbar block, 25 gauge vitrectomy, Dextrose in bottle, Indocyanine Green (ICG), Macular lens, ILM Forceps, and Avastin      Leo Meng MD  Vitreoretinal Surgery   Department of Ophthalmology

## 2024-06-12 ENCOUNTER — HOSPITAL ENCOUNTER (OUTPATIENT)
Facility: HOSPITAL | Age: 34
Discharge: HOME | End: 2024-06-12
Attending: OPHTHALMOLOGY | Admitting: OPHTHALMOLOGY
Payer: COMMERCIAL

## 2024-06-12 ENCOUNTER — ANESTHESIA (OUTPATIENT)
Dept: SURGERY | Facility: HOSPITAL | Age: 34
End: 2024-06-12
Payer: COMMERCIAL

## 2024-06-12 ENCOUNTER — ANESTHESIA EVENT (OUTPATIENT)
Dept: SURGERY | Facility: HOSPITAL | Age: 34
End: 2024-06-12
Payer: COMMERCIAL

## 2024-06-12 ENCOUNTER — NURSE TRIAGE (OUTPATIENT)
Dept: ADMINISTRATIVE | Facility: CLINIC | Age: 34
End: 2024-06-12
Payer: COMMERCIAL

## 2024-06-12 VITALS
DIASTOLIC BLOOD PRESSURE: 109 MMHG | BODY MASS INDEX: 26.5 KG/M2 | SYSTOLIC BLOOD PRESSURE: 194 MMHG | TEMPERATURE: 98 F | OXYGEN SATURATION: 98 % | HEIGHT: 73 IN | WEIGHT: 199.94 LBS | HEART RATE: 110 BPM | RESPIRATION RATE: 20 BRPM

## 2024-06-12 DIAGNOSIS — H33.21 RETINAL DETACHMENT, RIGHT: ICD-10-CM

## 2024-06-12 DIAGNOSIS — E10.3521 TYPE 1 DIABETES MELLITUS WITH RIGHT EYE AFFECTED BY PROLIFERATIVE RETINOPATHY AND TRACTION RETINAL DETACHMENT INVOLVING MACULA: Primary | ICD-10-CM

## 2024-06-12 LAB
POCT GLUCOSE: 63 MG/DL (ref 70–110)
POCT GLUCOSE: 76 MG/DL (ref 70–110)

## 2024-06-12 PROCEDURE — 63600175 PHARM REV CODE 636 W HCPCS: Performed by: NURSE ANESTHETIST, CERTIFIED REGISTERED

## 2024-06-12 PROCEDURE — 25000003 PHARM REV CODE 250: Performed by: OPHTHALMOLOGY

## 2024-06-12 PROCEDURE — 25000003 PHARM REV CODE 250: Performed by: STUDENT IN AN ORGANIZED HEALTH CARE EDUCATION/TRAINING PROGRAM

## 2024-06-12 PROCEDURE — 36000706: Performed by: OPHTHALMOLOGY

## 2024-06-12 PROCEDURE — 27201423 OPTIME MED/SURG SUP & DEVICES STERILE SUPPLY: Performed by: OPHTHALMOLOGY

## 2024-06-12 PROCEDURE — 36000707: Performed by: OPHTHALMOLOGY

## 2024-06-12 PROCEDURE — 37000009 HC ANESTHESIA EA ADD 15 MINS: Performed by: OPHTHALMOLOGY

## 2024-06-12 PROCEDURE — 82962 GLUCOSE BLOOD TEST: CPT | Performed by: OPHTHALMOLOGY

## 2024-06-12 PROCEDURE — D9220A PRA ANESTHESIA: Mod: ANES,,, | Performed by: ANESTHESIOLOGY

## 2024-06-12 PROCEDURE — 99499 UNLISTED E&M SERVICE: CPT | Mod: ,,, | Performed by: STUDENT IN AN ORGANIZED HEALTH CARE EDUCATION/TRAINING PROGRAM

## 2024-06-12 PROCEDURE — 25000003 PHARM REV CODE 250: Performed by: NURSE ANESTHETIST, CERTIFIED REGISTERED

## 2024-06-12 PROCEDURE — C1784 OCULAR DEV, INTRAOP, DET RET: HCPCS | Performed by: OPHTHALMOLOGY

## 2024-06-12 PROCEDURE — 71000044 HC DOSC ROUTINE RECOVERY FIRST HOUR: Performed by: OPHTHALMOLOGY

## 2024-06-12 PROCEDURE — 37000008 HC ANESTHESIA 1ST 15 MINUTES: Performed by: OPHTHALMOLOGY

## 2024-06-12 PROCEDURE — D9220A PRA ANESTHESIA: Mod: CRNA,,, | Performed by: NURSE ANESTHETIST, CERTIFIED REGISTERED

## 2024-06-12 PROCEDURE — 67113 REPAIR RETINAL DETACH CPLX: CPT | Mod: RT,,, | Performed by: OPHTHALMOLOGY

## 2024-06-12 PROCEDURE — 27200651 HC AIRWAY, LMA: Performed by: ANESTHESIOLOGY

## 2024-06-12 PROCEDURE — 63600175 PHARM REV CODE 636 W HCPCS: Performed by: OPHTHALMOLOGY

## 2024-06-12 RX ORDER — HYDROMORPHONE HYDROCHLORIDE 1 MG/ML
0.2 INJECTION, SOLUTION INTRAMUSCULAR; INTRAVENOUS; SUBCUTANEOUS EVERY 5 MIN PRN
Status: DISCONTINUED | OUTPATIENT
Start: 2024-06-12 | End: 2024-06-12 | Stop reason: HOSPADM

## 2024-06-12 RX ORDER — ONDANSETRON 4 MG/1
4 TABLET, FILM COATED ORAL EVERY 8 HOURS PRN
Qty: 12 TABLET | Refills: 0 | Status: SHIPPED | OUTPATIENT
Start: 2024-06-12

## 2024-06-12 RX ORDER — LIDOCAINE HYDROCHLORIDE 20 MG/ML
INJECTION, SOLUTION EPIDURAL; INFILTRATION; INTRACAUDAL; PERINEURAL
Status: DISCONTINUED
Start: 2024-06-12 | End: 2024-06-12 | Stop reason: HOSPADM

## 2024-06-12 RX ORDER — PROCHLORPERAZINE EDISYLATE 5 MG/ML
5 INJECTION INTRAMUSCULAR; INTRAVENOUS EVERY 30 MIN PRN
Status: DISCONTINUED | OUTPATIENT
Start: 2024-06-12 | End: 2024-06-12 | Stop reason: HOSPADM

## 2024-06-12 RX ORDER — FENTANYL CITRATE 50 UG/ML
25 INJECTION, SOLUTION INTRAMUSCULAR; INTRAVENOUS EVERY 5 MIN PRN
Status: DISCONTINUED | OUTPATIENT
Start: 2024-06-12 | End: 2024-06-12 | Stop reason: HOSPADM

## 2024-06-12 RX ORDER — LIDOCAINE HYDROCHLORIDE 20 MG/ML
INJECTION, SOLUTION EPIDURAL; INFILTRATION; INTRACAUDAL; PERINEURAL
Status: DISCONTINUED | OUTPATIENT
Start: 2024-06-12 | End: 2024-06-12 | Stop reason: HOSPADM

## 2024-06-12 RX ORDER — INDOCYANINE GREEN AND WATER 25 MG
KIT INJECTION
Status: DISCONTINUED | OUTPATIENT
Start: 2024-06-12 | End: 2024-06-12 | Stop reason: HOSPADM

## 2024-06-12 RX ORDER — ACETAMINOPHEN 325 MG/1
650 TABLET ORAL EVERY 4 HOURS PRN
Status: DISCONTINUED | OUTPATIENT
Start: 2024-06-12 | End: 2024-06-12 | Stop reason: HOSPADM

## 2024-06-12 RX ORDER — MOXIFLOXACIN 5 MG/ML
1 SOLUTION/ DROPS OPHTHALMIC
Status: DISCONTINUED | OUTPATIENT
Start: 2024-06-12 | End: 2024-06-12 | Stop reason: HOSPADM

## 2024-06-12 RX ORDER — PHENYLEPHRINE HYDROCHLORIDE 25 MG/ML
1 SOLUTION/ DROPS OPHTHALMIC
Status: DISCONTINUED | OUTPATIENT
Start: 2024-06-12 | End: 2024-06-12 | Stop reason: HOSPADM

## 2024-06-12 RX ORDER — ONDANSETRON HYDROCHLORIDE 2 MG/ML
INJECTION, SOLUTION INTRAVENOUS
Status: DISCONTINUED | OUTPATIENT
Start: 2024-06-12 | End: 2024-06-12

## 2024-06-12 RX ORDER — ATROPINE SULFATE 10 MG/ML
1 SOLUTION/ DROPS OPHTHALMIC
Status: DISCONTINUED | OUTPATIENT
Start: 2024-06-12 | End: 2024-06-12 | Stop reason: HOSPADM

## 2024-06-12 RX ORDER — PREDNISOLONE ACETATE 10 MG/ML
1 SUSPENSION/ DROPS OPHTHALMIC
Status: DISCONTINUED | OUTPATIENT
Start: 2024-06-12 | End: 2024-06-12 | Stop reason: HOSPADM

## 2024-06-12 RX ORDER — DEXAMETHASONE SODIUM PHOSPHATE 4 MG/ML
INJECTION, SOLUTION INTRA-ARTICULAR; INTRALESIONAL; INTRAMUSCULAR; INTRAVENOUS; SOFT TISSUE
Status: DISCONTINUED
Start: 2024-06-12 | End: 2024-06-12 | Stop reason: HOSPADM

## 2024-06-12 RX ORDER — TETRACAINE HYDROCHLORIDE 5 MG/ML
1 SOLUTION OPHTHALMIC
Status: DISCONTINUED | OUTPATIENT
Start: 2024-06-12 | End: 2024-06-12 | Stop reason: HOSPADM

## 2024-06-12 RX ORDER — FENTANYL CITRATE 50 UG/ML
INJECTION, SOLUTION INTRAMUSCULAR; INTRAVENOUS
Status: DISCONTINUED | OUTPATIENT
Start: 2024-06-12 | End: 2024-06-12

## 2024-06-12 RX ORDER — SODIUM CHLORIDE 0.9 % (FLUSH) 0.9 %
10 SYRINGE (ML) INJECTION
Status: DISCONTINUED | OUTPATIENT
Start: 2024-06-12 | End: 2024-06-12 | Stop reason: HOSPADM

## 2024-06-12 RX ORDER — MIDAZOLAM HYDROCHLORIDE 1 MG/ML
INJECTION INTRAMUSCULAR; INTRAVENOUS
Status: DISCONTINUED | OUTPATIENT
Start: 2024-06-12 | End: 2024-06-12

## 2024-06-12 RX ORDER — TETRACAINE HYDROCHLORIDE 5 MG/ML
SOLUTION OPHTHALMIC
Status: DISCONTINUED | OUTPATIENT
Start: 2024-06-12 | End: 2024-06-12 | Stop reason: HOSPADM

## 2024-06-12 RX ORDER — OXYCODONE AND ACETAMINOPHEN 5; 325 MG/1; MG/1
1 TABLET ORAL EVERY 6 HOURS PRN
Qty: 12 TABLET | Refills: 0 | Status: SHIPPED | OUTPATIENT
Start: 2024-06-12

## 2024-06-12 RX ORDER — DEXAMETHASONE SODIUM PHOSPHATE 4 MG/ML
INJECTION, SOLUTION INTRA-ARTICULAR; INTRALESIONAL; INTRAMUSCULAR; INTRAVENOUS; SOFT TISSUE
Status: DISCONTINUED | OUTPATIENT
Start: 2024-06-12 | End: 2024-06-12 | Stop reason: HOSPADM

## 2024-06-12 RX ORDER — PROPOFOL 10 MG/ML
VIAL (ML) INTRAVENOUS
Status: DISCONTINUED | OUTPATIENT
Start: 2024-06-12 | End: 2024-06-12

## 2024-06-12 RX ORDER — LIDOCAINE HYDROCHLORIDE 20 MG/ML
INJECTION INTRAVENOUS
Status: DISCONTINUED | OUTPATIENT
Start: 2024-06-12 | End: 2024-06-12

## 2024-06-12 RX ORDER — HALOPERIDOL 5 MG/ML
0.5 INJECTION INTRAMUSCULAR EVERY 10 MIN PRN
Status: DISCONTINUED | OUTPATIENT
Start: 2024-06-12 | End: 2024-06-12 | Stop reason: HOSPADM

## 2024-06-12 RX ORDER — EPINEPHRINE 1 MG/ML
INJECTION, SOLUTION, CONCENTRATE INTRAVENOUS
Status: DISCONTINUED | OUTPATIENT
Start: 2024-06-12 | End: 2024-06-12 | Stop reason: HOSPADM

## 2024-06-12 RX ORDER — OXYCODONE HYDROCHLORIDE 5 MG/1
5 TABLET ORAL
Status: DISCONTINUED | OUTPATIENT
Start: 2024-06-12 | End: 2024-06-12 | Stop reason: HOSPADM

## 2024-06-12 RX ORDER — HYDROCODONE BITARTRATE AND ACETAMINOPHEN 5; 325 MG/1; MG/1
1 TABLET ORAL EVERY 4 HOURS PRN
Status: DISCONTINUED | OUTPATIENT
Start: 2024-06-12 | End: 2024-06-12 | Stop reason: HOSPADM

## 2024-06-12 RX ORDER — NEOMYCIN SULFATE, POLYMYXIN B SULFATE, AND DEXAMETHASONE 3.5; 10000; 1 MG/G; [USP'U]/G; MG/G
OINTMENT OPHTHALMIC
Status: DISCONTINUED | OUTPATIENT
Start: 2024-06-12 | End: 2024-06-12 | Stop reason: HOSPADM

## 2024-06-12 RX ORDER — DEXMEDETOMIDINE HYDROCHLORIDE 100 UG/ML
INJECTION, SOLUTION INTRAVENOUS
Status: DISCONTINUED | OUTPATIENT
Start: 2024-06-12 | End: 2024-06-12

## 2024-06-12 RX ORDER — NEOMYCIN SULFATE, POLYMYXIN B SULFATE, AND DEXAMETHASONE 3.5; 10000; 1 MG/G; [USP'U]/G; MG/G
OINTMENT OPHTHALMIC
Status: DISCONTINUED
Start: 2024-06-12 | End: 2024-06-12 | Stop reason: HOSPADM

## 2024-06-12 RX ADMIN — ONDANSETRON 4 MG: 2 INJECTION INTRAMUSCULAR; INTRAVENOUS at 12:06

## 2024-06-12 RX ADMIN — ATROPINE SULFATE 1 DROP: 10 SOLUTION/ DROPS OPHTHALMIC at 11:06

## 2024-06-12 RX ADMIN — PHENYLEPHRINE HYDROCHLORIDE 1 DROP: 25 SOLUTION/ DROPS OPHTHALMIC at 11:06

## 2024-06-12 RX ADMIN — FENTANYL CITRATE 50 MCG: 50 INJECTION, SOLUTION INTRAMUSCULAR; INTRAVENOUS at 01:06

## 2024-06-12 RX ADMIN — SODIUM CHLORIDE: 0.9 INJECTION, SOLUTION INTRAVENOUS at 12:06

## 2024-06-12 RX ADMIN — FENTANYL CITRATE 50 MCG: 50 INJECTION, SOLUTION INTRAMUSCULAR; INTRAVENOUS at 12:06

## 2024-06-12 RX ADMIN — PREDNISOLONE ACETATE 1 DROP: 10 SUSPENSION/ DROPS OPHTHALMIC at 11:06

## 2024-06-12 RX ADMIN — MOXIFLOXACIN OPHTHALMIC 1 DROP: 5 SOLUTION/ DROPS OPHTHALMIC at 11:06

## 2024-06-12 RX ADMIN — PROPOFOL 200 MG: 10 INJECTION, EMULSION INTRAVENOUS at 12:06

## 2024-06-12 RX ADMIN — DEXMEDETOMIDINE 8 MCG: 100 INJECTION, SOLUTION, CONCENTRATE INTRAVENOUS at 12:06

## 2024-06-12 RX ADMIN — MIDAZOLAM HYDROCHLORIDE 2 MG: 2 INJECTION, SOLUTION INTRAMUSCULAR; INTRAVENOUS at 12:06

## 2024-06-12 RX ADMIN — LIDOCAINE HYDROCHLORIDE 80 MG: 20 INJECTION INTRAVENOUS at 12:06

## 2024-06-12 RX ADMIN — TETRACAINE HYDROCHLORIDE 1 DROP: 5 SOLUTION OPHTHALMIC at 11:06

## 2024-06-12 NOTE — ANESTHESIA PREPROCEDURE EVALUATION
06/12/2024  Rusty Burger is a 33 y.o., male.      Pre-op Assessment    I have reviewed the Patient Summary Reports.    I have reviewed the NPO Status.      Review of Systems  Anesthesia Hx:  No problems with previous Anesthesia   History of prior surgery of interest to airway management or planning:          Denies Family Hx of Anesthesia complications.    Denies Personal Hx of Anesthesia complications.                    Social:  Non-Smoker, Social Alcohol Use       Cardiovascular:  Cardiovascular Normal Exercise tolerance: good                                           Pulmonary:  Pulmonary Normal       Denies Recent URI.  Denies Sleep Apnea.                Neurological:  Neurology Normal Denies TIA.  Denies CVA.    Denies Seizures.                                Endocrine:  Diabetes             Physical Exam  General: Well nourished, Alert and Oriented    Airway:  Mallampati: II   Mouth Opening: Normal  TM Distance: Normal  Tongue: Normal  Neck ROM: Normal ROM    Dental:  Intact    Chest/Lungs:  Normal Respiratory Rate    Heart:  Rate: Normal    Anesthesia Plan  Type of Anesthesia, risks & benefits discussed:    Anesthesia Type: Gen Natural Airway, Gen Supraglottic Airway, Gen ETT  Intra-op Monitoring Plan: Standard ASA Monitors  Induction:  IV  Informed Consent: Informed consent signed with the Patient and all parties understand the risks and agree with anesthesia plan.  All questions answered.   ASA Score: 3    Ready For Surgery From Anesthesia Perspective.   .

## 2024-06-12 NOTE — OP NOTE
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  He was found to have worsening vitreous hemorrhage and traction detachment extending to the superior macula.  Decision was made to take the patient to surgery to repair the TRD, prevent further vision loss and hopefully improved some vision.  Risks, benefits, and alternatives to surgery were discussed in detail. Risks including loss of vision, loss of eye, retina detachment, hemorrhage, infection, lens dislocation, glaucoma, hypotony, ptosis, diplopia    Description of procedure:  After proper informed consent was obtained, the patient was brought back to the operating room at [facility] where monitored anesthesia care was induced.  A retrobulbar injection was provided above without complication.  The patient is prepped draped in normal sterile fashion for ophthalmic surgery and a lid speculum was placed in the right eye.  A standard 3 port 25 gauge pars plana vitrectomy setup with the infusion cannula inserted 4.0 mm posterior to the limbus.  The infusion cannula was turned on after it was observed free and clear of all underlying tissue.  Super nasal and superotemporal trocars were also placed  4.0 mm posterior to the limbus.  The vitrector and light pipe were introduced in the vitreous cavity and a core vitrectomy was performed.   The perpheral hyaloid was  from the posterior tractional elements with the vitrector.  The ILM forceps and vitrector were used  to strip away the fibrotic attachments to the retina.   ICG was used to stain the ILM, which was peeled off under direct contact lens visualization with the ILM foceps.  A 360 degree cortical vitrectomy was performed.  Endolaser was applied around  in a pan retinal photocoagulation fashion.  Avastin was injected through the ST trocar. The  trocars were removed and not leaking after gentle massage.  The eye was normal pressure via palpation.  Subconjunctival injections of vancomycin and Decadron were given and  the patient's the drapes removed. the patient was washed free of Betadine prep solution,  ointment was placed in the eye then patched shielded, mac anesthesia was reversed and he was brought to recovery in stable condition tolerating the procedure well.  Dr. Burkett was present for in entire case.

## 2024-06-12 NOTE — PROGRESS NOTES
"On call ophthalmology resident. I was contacted regarding patient attempting to leave AMA from PACU. After attending to other urgent patient needs and it was safe for me to come to evaluate patient, I presented to the PACU to assess patient. Patient was already gone from PACU before I was able to assess him. He was evaluated by PACU nursing staff and anesthesiology. Patient required escort by security per nursing for non-redirectable aggressive behavior. Patient will be contacted regarding post-operative follow up appointment tomorrow.     "Tyler" Dillon Ochoa III, MD  [facility]    "

## 2024-06-12 NOTE — BRIEF OP NOTE
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

## 2024-06-12 NOTE — ANESTHESIA POSTPROCEDURE EVALUATION
Anesthesia Post Evaluation    Patient: Rusty Burger    Procedure(s) Performed: Procedure(s) (LRB):  REPAIR,RETINAL DETACHMENT,COMPLEX,WITH VITRECTOMY AND MEMBRANE PEELING (Right)    Final Anesthesia Type: general      Patient location during evaluation: PACU  Patient participation: Yes- Able to Participate  Level of consciousness: awake and alert  Post-procedure vital signs: reviewed and stable  Pain management: adequate  Airway patency: patent    PONV status at discharge: No PONV  Anesthetic complications: no      Cardiovascular status: blood pressure returned to baseline  Respiratory status: room air  Hydration status: euvolemic  Follow-up not needed.  Comments: Pt had social issue in  PACU. Concern for availability of ride in setting of disagreement with his present girlfriend (with whom he arrived). Pt threatening to leave AMA. Hypertensive but otherwise meeting discharge criteria.  Primary team was notified. Security was called.           Vitals Value Taken Time   /109 06/12/24 1431   Temp 36.8 °C (98.2 °F) 06/12/24 1430   Pulse 134 06/12/24 1441   Resp 58 06/12/24 1441   SpO2 99 % 06/12/24 1439   Vitals shown include unfiled device data.      No case tracking events are documented in the log.      Pain/Sharri Score: Sharri Score: 9 (6/12/2024  2:30 PM)

## 2024-06-12 NOTE — TRANSFER OF CARE
"Anesthesia Transfer of Care Note    Patient: Rusty Burger    Procedure(s) Performed: Procedure(s) (LRB):  REPAIR,RETINAL DETACHMENT,COMPLEX,WITH VITRECTOMY AND MEMBRANE PEELING (Right)    Patient location: PACU    Anesthesia Type: general    Transport from OR: Transported from OR on 6-10 L/min O2 by face mask with adequate spontaneous ventilation    Post pain: adequate analgesia    Post assessment: no apparent anesthetic complications and tolerated procedure well    Post vital signs: stable    Level of consciousness: awake and alert    Nausea/Vomiting: no nausea/vomiting    Complications: none    Transfer of care protocol was followed      Last vitals: Visit Vitals  BP (!) 163/103 (BP Location: Right arm, Patient Position: Lying)   Pulse 76   Temp 37.1 °C (98.8 °F) (Temporal)   Resp 18   Ht 6' 1" (1.854 m)   Wt 90.7 kg (199 lb 15.3 oz)   SpO2 99%   BMI 26.38 kg/m²     "

## 2024-06-12 NOTE — PROGRESS NOTES
"Patient states he is refusing to go home with "Henry" who is present and listed as patient's contact/responsible party for today's procedure.  Patient began requesting his keys from Henry.  Staff informed patient that he is not able to drive for the next 24 hours due to receiving anesthesia.  Henry paid for patient's prescription and walked out of the post-operative area with patient's phone and keys, allegedly.        Patient climbed out of stretcher and started removing monitor leads/wires.  Patient refused to be hooked back up to the monitor when writer stated he needs additional post-operative monitoring.  Patient began yelling, cursing, and stating he will "leave now" and we should "call the  because his phone and keys are stolen property".  Security was called.  Writer contacted Dr. Burkett who states he is "not at the facility and will have to sign the AMA form later"; resident/fellow are unavailable to provide signature.  Anesthesia refusing to sign AMA form stating the surgeon needs to sign.       Henry returned to post-operative area with patient's phone and keys in hand.  Patient states he wants to "sign the form now because he is leaving".  Patient verbalizes an understanding of risks associated with leaving against medical advice.  Patient signed form; patient refusing to listen to discharge teaching and refusing printed AVS.  Patient alert and oriented x4 upon leaving post-operative area.  AMA form left at nurses' station for Dr. Burkett to review/sign upon return to facility.        Security escorted the patient/Henry out of the hospital.  Patient left in stable condition with all belongings including prescriptions from today's procedure.           "

## 2024-06-12 NOTE — DISCHARGE SUMMARY
[facility] (1st Fl)  Discharge Note  Short Stay    Procedure(s) (LRB):  REPAIR,RETINAL DETACHMENT,COMPLEX,WITH VITRECTOMY AND MEMBRANE PEELING (Right)      OUTCOME: Patient tolerated treatment/procedure well without complication and is now ready for discharge.    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    FOLLOWUP: In clinic    DISCHARGE INSTRUCTIONS:    Discharge Procedure Orders   Diet general     Lifting restrictions     Call MD for:  temperature >100.4     Call MD for:  persistent nausea and vomiting     Call MD for:  severe uncontrolled pain     Call MD for:  difficulty breathing, headache or visual disturbances     Call MD for:  redness, tenderness, or signs of infection (pain, swelling, redness, odor or green/yellow discharge around incision site)     Call MD for:  hives     Call MD for:  persistent dizziness or light-headedness     Call MD for:  extreme fatigue        TIME SPENT ON DISCHARGE:    minutes

## 2024-06-13 ENCOUNTER — OFFICE VISIT (OUTPATIENT)
Dept: OPHTHALMOLOGY | Facility: CLINIC | Age: 34
End: 2024-06-13
Payer: COMMERCIAL

## 2024-06-13 DIAGNOSIS — Z98.890 HISTORY OF VITRECTOMY: ICD-10-CM

## 2024-06-13 DIAGNOSIS — Z98.890 POST-OPERATIVE STATE: Primary | ICD-10-CM

## 2024-06-13 PROCEDURE — 1159F MED LIST DOCD IN RCRD: CPT | Mod: CPTII,S$GLB,, | Performed by: OPHTHALMOLOGY

## 2024-06-13 PROCEDURE — 99999 PR PBB SHADOW E&M-EST. PATIENT-LVL III: CPT | Mod: PBBFAC,,, | Performed by: OPHTHALMOLOGY

## 2024-06-13 PROCEDURE — 4010F ACE/ARB THERAPY RXD/TAKEN: CPT | Mod: CPTII,S$GLB,, | Performed by: OPHTHALMOLOGY

## 2024-06-13 PROCEDURE — 99024 POSTOP FOLLOW-UP VISIT: CPT | Mod: S$GLB,,, | Performed by: OPHTHALMOLOGY

## 2024-06-13 PROCEDURE — 1160F RVW MEDS BY RX/DR IN RCRD: CPT | Mod: CPTII,S$GLB,, | Performed by: OPHTHALMOLOGY

## 2024-06-13 NOTE — LETTER
June 13, 2024    Rusty Burger  [address]  [place] [address]         [facility]  [address] [facility] [place] [address]  Phone: [phone]  Fax: [phone] June 13, 2024     Patient: Rusty Burger   YOB: 1990   Date of Visit: 6/13/2024       To Whom It May Concern:    It is my medical opinion that Rusty Burger should remain out of work until 6/30/2024 .    If you have any questions or concerns, please don't hesitate to call.    Sincerely,        YESSENIA Bang MD

## 2024-06-13 NOTE — TELEPHONE ENCOUNTER
"Pt states "left on own will" today after surgery. Pt asking for discharge instructions. Placed pt on hold to open chart and AVS instructions, pt no longer on the line, can only hear dog barking. Attempted to call pt back, no answer. Second attempt to contact pt, pt answers, asking for post op instructions about eating, drinking, and taking home meds. Reviewed AVS instructions per pt chart, reviewed when to call MD and diet instructions. Pt asking for clarification on appt tomorrow morning--- advised pt to return to [facility] in AM for post op appt, no specific time listed. Pt advised to call back for any further questions or concerns. Pt verbalizes understanding. Pt also denies any changes or symptoms at this time.   Reason for Disposition   General activity, questions about    Protocols used: Post-Op Symptoms and [id number]    "

## 2024-06-13 NOTE — PROGRESS NOTES
===============================  Date today is 6/13/2024  Rusty Burger is a 33 y.o. male  Last visit [facility]: :5/20/2024   Last visit eye dept. 5/20/2024    Uncorrected distance visual acuity was HM in the right eye and 20/100 in the left eye.  Tonometry       Tonometry (Applanation, 8:39 AM)         Right Left    Pressure 10                   Not recorded       Not recorded       Not recorded       Chief Complaint   Patient presents with    Post-op Evaluation     1 day PO check/  per Dr. Burkett     HPI     Post-op Evaluation            Comments: 1 day PO check/  per Dr. Burkett          Comments    1. Dm dx 2007  OS traction detachment  OU PDR  2. 4/19/23--- S/p 25g PPV/ILM peel with flap/membrane stripping/EL/C3F8  Avastin OS for   TRD with FTMH OS- 4/19/2023   PRP OD 2/26/24  PPV OD 6/12/24            Last edited by Brie Brandt on 6/13/2024  2:16 PM.      Problem List Items Addressed This Visit    None  Visit Diagnoses       Post-operative state    -  Primary    History of vitrectomy              Instructed to call 24/7 for any worsening of vision, visual distortion or pain.  Check OU independently daily.    Gave my office and personal cell phone number.  ________________  6/13/2024 today  Rusty Burger    :HPI              Comments: 1 day PO check/  per Dr. Burkett          Comments    1. Dm dx 2007    OS traction detachment  OU PDR     4/19/23--- OS  S/p 25g PPV/ILM peel with flap/membrane stripping/EL/C3F8  Following for  reabsorbtion of residual srf    6/12/24  --- OD  day one TRD from PDR OD  25g PPV/membrane stripping/ILM peel/AFx/EL/Avastin 1.25mg/0.05mL OD      Avastin OS for   TRD with FTMH OS- 4/19/2023   PRP OD 2/26/24  PPV OD 6/12/24   100%  bubble   Looks great flat, no blood  + prp     t 10  Rtc mid week   A1 bid   Pf qid  vig qid  sheild             =============================

## 2024-06-20 ENCOUNTER — PROCEDURE VISIT (OUTPATIENT)
Dept: OPHTHALMOLOGY | Facility: CLINIC | Age: 34
End: 2024-06-20
Payer: COMMERCIAL

## 2024-06-20 DIAGNOSIS — Z98.890 HISTORY OF VITRECTOMY: ICD-10-CM

## 2024-06-20 DIAGNOSIS — Z98.890 POST-OPERATIVE STATE: Primary | ICD-10-CM

## 2024-06-20 PROCEDURE — 99024 POSTOP FOLLOW-UP VISIT: CPT | Mod: S$GLB,,, | Performed by: OPHTHALMOLOGY

## 2024-06-20 NOTE — PROGRESS NOTES
===============================  Date today is 6/20/2024  Rusty Burger is a 33 y.o. male  Last visit [facility]: :6/13/2024   Last visit eye dept. 6/13/2024    Uncorrected distance visual acuity was 20/200 in the right eye and 20/200 in the left eye.  Tonometry       Tonometry (Applanation, 10:45 AM)         Right Left    Pressure 16                   Not recorded       Not recorded       Not recorded       Chief Complaint   Patient presents with    Post-op Evaluation     PPV OD  7 DAY CHECK UP     HPI     Post-op Evaluation            Comments: PPV OD  7 DAY CHECK UP          Comments    1. Dm dx 2007  OS traction detachment  OU PDR  2. 4/19/23--- S/p 25g PPV/ILM peel with flap/membrane stripping/EL/C3F8  Avastin OS for   TRD with FTMH OS- 4/19/2023   PRP OD 2/26/24  PPV OD 6/12/24            Last edited by Brie Brandt on 6/20/2024  7:49 AM.      Problem List Items Addressed This Visit    None  Visit Diagnoses       Post-operative state    -  Primary    History of vitrectomy              Instructed to call 24/7 for any worsening of vision, visual distortion or pain.  Check OU independently daily.    Gave my office and personal cell phone number.  ________________  6/20/2024 today  Rusty Burger    Post PPV OD 6/12/24 for RD   10% bubble  Soft VH OD  IOP 16  Will continue to improve  Reviewed drops with patient  Atropine daily OD, PF BID, d/c vigamox    RTC 2 weeks  Instructed to call 24/7 for any worsening of vision or symptoms. Check OU daily.   Gave my office and cell phone number.    =============================

## 2024-06-24 ENCOUNTER — TELEPHONE (OUTPATIENT)
Dept: OPHTHALMOLOGY | Facility: CLINIC | Age: 34
End: 2024-06-24
Payer: COMMERCIAL

## 2024-07-08 ENCOUNTER — OFFICE VISIT (OUTPATIENT)
Dept: OPHTHALMOLOGY | Facility: CLINIC | Age: 34
End: 2024-07-08
Payer: COMMERCIAL

## 2024-07-08 DIAGNOSIS — Z98.890 POST-OPERATIVE STATE: Primary | ICD-10-CM

## 2024-07-08 DIAGNOSIS — Z98.890 HISTORY OF VITRECTOMY: ICD-10-CM

## 2024-07-08 PROCEDURE — 99024 POSTOP FOLLOW-UP VISIT: CPT | Mod: S$GLB,,, | Performed by: OPHTHALMOLOGY

## 2024-07-08 PROCEDURE — 92134 CPTRZ OPH DX IMG PST SGM RTA: CPT | Mod: S$GLB,,, | Performed by: OPHTHALMOLOGY

## 2024-07-08 PROCEDURE — 1159F MED LIST DOCD IN RCRD: CPT | Mod: CPTII,S$GLB,, | Performed by: OPHTHALMOLOGY

## 2024-07-08 PROCEDURE — 1160F RVW MEDS BY RX/DR IN RCRD: CPT | Mod: CPTII,S$GLB,, | Performed by: OPHTHALMOLOGY

## 2024-07-08 PROCEDURE — 99999 PR PBB SHADOW E&M-EST. PATIENT-LVL II: CPT | Mod: PBBFAC,,, | Performed by: OPHTHALMOLOGY

## 2024-07-08 PROCEDURE — 4010F ACE/ARB THERAPY RXD/TAKEN: CPT | Mod: CPTII,S$GLB,, | Performed by: OPHTHALMOLOGY

## 2024-07-08 NOTE — PROGRESS NOTES
===============================  Date today is 7/8/2024  Rusty Burger is a 33 y.o. male  Last visit [facility]: :6/20/2024   Last visit eye dept. 6/20/2024    Uncorrected distance visual acuity was 20/100 in the right eye and 20/200 in the left eye.  Tonometry       Tonometry (Tonopen, 1:05 PM)         Right Left    Pressure 20                   Not recorded       Manifest Refraction       Manifest Refraction         Sphere Cylinder Dist VA    Right -1.25 Sphere 20/40    Left -0.75 Sphere 20/40                  Not recorded       Chief Complaint   Patient presents with    Post-op Evaluation     Pt is here for PPV OD post op. Denies pain or irritation. Va stable. Pt still using the pink cap gtts BID OD     HPI     Post-op Evaluation            Comments: Pt is here for PPV OD post op. Denies pain or irritation. Va   stable. Pt still using the pink cap gtts BID OD          Comments    1. Dm dx 2007  OS traction detachment  OU PDR  2. 4/19/23--- S/p 25g PPV/ILM peel with flap/membrane stripping/EL/C3F8  Avastin OS for   TRD with FTMH OS- 4/19/2023+  PRP OD 2/26/24    OD PPV for RD 6/12/24 with Dr. Burkett             Last edited by Melodie Farrell on 7/8/2024  1:02 PM.      Problem List Items Addressed This Visit    None  Visit Diagnoses       Post-operative state    -  Primary    History of vitrectomy        Relevant Orders    Posterior Segment OCT Retina-Both eyes (Completed)          Instructed to call 24/7 for any worsening of vision, visual distortion or pain.  Check OU independently daily.    Gave my office and personal cell phone number.  ________________  7/8/2024 today  Rusty Burger    Post PPV OD 6/12/24 for RD  OCT looks good    Clear view in  Vitreous clear  Post PRP OD  Good segmentation of FVP  No bubble  Doing well, ok to resume work and normal activities  Will update glasses today with the expectation Mrx may change due to BS over 200  Continue drops until out    RTC 2 months  Instructed to call 24/7 for  any worsening of vision or symptoms. Check OU daily.   Gave my office and cell phone number.      =============================

## 2024-07-08 NOTE — LETTER
July 8, 2024    Rusty Burger  [address]  [place] [address]         [facility]  [address] [facility] [place] [address]  Phone: [phone]  Fax: [phone] July 8, 2024     Patient: Rusty Burger   YOB: 1990   Date of Visit: 7/8/2024       To Whom It May Concern:    It is my medical opinion that Rusty Burger may return to full duty immediately with no restrictions.    If you have any questions or concerns, please don't hesitate to call.    Sincerely,        YESSENIA Bang MD

## 2024-09-09 ENCOUNTER — OFFICE VISIT (OUTPATIENT)
Dept: OPHTHALMOLOGY | Facility: CLINIC | Age: 34
End: 2024-09-09
Payer: COMMERCIAL

## 2024-09-09 DIAGNOSIS — E10.3591 TYPE 1 DIABETES MELLITUS WITH PROLIFERATIVE RETINOPATHY OF RIGHT EYE WITHOUT MACULAR EDEMA: ICD-10-CM

## 2024-09-09 DIAGNOSIS — Z98.890 HISTORY OF VITRECTOMY: Primary | ICD-10-CM

## 2024-09-09 DIAGNOSIS — E10.3522: ICD-10-CM

## 2024-09-09 PROCEDURE — 99999 PR PBB SHADOW E&M-EST. PATIENT-LVL II: CPT | Mod: PBBFAC,,, | Performed by: OPHTHALMOLOGY

## 2024-09-09 NOTE — PROGRESS NOTES
===============================  Date today is 9/9/2024  Rusty Burger is a 33 y.o. male  Last visit [facility]: :7/8/2024   Last visit eye dept. 7/8/2024    Corrected distance visual acuity was 20/30 in the right eye and 20/50 in the left eye.  Tonometry       Tonometry (Applanation, 9:34 AM)         Right Left    Pressure 17 17                  Wearing Rx       Wearing Rx         Sphere Cylinder    Right -1.25 Sphere    Left -0.75 Sphere      Age: 1m    Type: SVL                  Manifest Refraction       Manifest Refraction         Sphere Cylinder    Right -1.25 Sphere    Left -0.75 Sphere                  Not recorded       Chief Complaint   Patient presents with    Follow-up     DIAB 2m       HPI     Follow-up            Comments: DIAB 2m            Comments    Stable vision states that his new glasses are working well and that his   BSL are still fluctuating.     1. Dm dx 2007  OS traction detachment  OU PDR  2. 4/19/23--- S/p 25g PPV/ILM peel with flap/membrane stripping/EL/C3F8  Avastin OS for   TRD with FTMH OS- 4/19/2023+  PRP OD 2/26/24    OD PPV for RD 6/12/24 with Dr. Burkett             Last edited by Talita Nieves on 9/9/2024  9:34 AM.      Problem List Items Addressed This Visit          Eye/Vision problems    Type 1 diabetes mellitus with proliferative retinopathy of right eye without macular edema    Relevant Orders    Posterior Segment OCT Retina-Both eyes (Completed)    Type 1 diabetes mellitus with left eye affected by proliferative retinopathy and traction retinal detachment involving macula    Relevant Orders    Posterior Segment OCT Retina-Both eyes (Completed)     Other Visit Diagnoses       History of vitrectomy    -  Primary          Instructed to call 24/7 for any worsening of vision, visual distortion or pain.  Check OU independently daily.    Gave my office and personal cell phone number.  ________________  9/9/2024 today  Rusty Burger    OU PDR  Doing well   S/p prp   No nv    OS ppv     4/24 TRD with FTMH from PDR OS  6/24  TRD from PDR OD     Os  4+ ischemic vessels   Oct no dme      RTC 3 months   Instructed to call 24/7 for any worsening of vision or symptoms. Check OU daily.   Gave my office and cell phone number.      =============================

## (undated) DEVICE — PACK TOTAL PLUS 25G VITRECTOMY

## (undated) DEVICE — STRIP MEDI WND CLSR 1/2X4IN

## (undated) DEVICE — COVER MAYO STAND REINFRCD 30

## (undated) DEVICE — HOLDER TUBE

## (undated) DEVICE — NEEDLE HYPODERMIC HUB LUER LOC

## (undated) DEVICE — PACK INSTRUMENT COVER DISPO

## (undated) DEVICE — FORCEP GRASPING 25GA SMOOTH

## (undated) DEVICE — KIT PERFLUOROCARBON LIQUID

## (undated) DEVICE — SYR DISP LL 5CC

## (undated) DEVICE — SUT 7/0 18IN COATED VICRYL

## (undated) DEVICE — LENS VITRCTMY OPHTH 30DEG 59DE

## (undated) DEVICE — TRAY MUSCLE LID EYE

## (undated) DEVICE — NDL 22GA X1 1/2 REG BEVEL

## (undated) DEVICE — SYR 10CC LUER LOCK

## (undated) DEVICE — SYRINGE 30CC LL W/O NDL

## (undated) DEVICE — DRESSING EYE OVAL LF

## (undated) DEVICE — NDL HYPO A BEVEL 30X1/2

## (undated) DEVICE — CONTAINER SPECIMEN OR STER 4OZ

## (undated) DEVICE — FORCEP GRIESHABER MAXGRIP 25G

## (undated) DEVICE — DRAPE THREE-QTR REINF 53X77IN

## (undated) DEVICE — NDL HYPO STD REG BVL 30G 0.5IN

## (undated) DEVICE — GOWN SURGICAL X-LARGE

## (undated) DEVICE — SOL BSS BALANCED SALT

## (undated) DEVICE — SOL WATER STRL IRR 1000ML

## (undated) DEVICE — SYR 1CC TB SG 27GX1/2

## (undated) DEVICE — KNIFE OPHTH MICRO UNITOME 5MM

## (undated) DEVICE — BACKFLUSH 25GA SOFT-TIP DISP

## (undated) DEVICE — SOL BETADINE 5%

## (undated) DEVICE — GAS ISPAN C3F8 20GM

## (undated) DEVICE — GLOVE BIOGEL ECLIPSE SZ 6.5

## (undated) DEVICE — SOL GONAK

## (undated) DEVICE — COVER PROXIMA MAYO STAND

## (undated) DEVICE — PROBE ILLUM FLEX CURVE LASER

## (undated) DEVICE — CORD FOR BIPOLAR FORCEPS 12

## (undated) DEVICE — CONTAINER SPECIMEN STRL 4OZ

## (undated) DEVICE — KIT GREY EYE

## (undated) DEVICE — SOL BALANCED SALT 500ML